# Patient Record
Sex: FEMALE | Race: WHITE | NOT HISPANIC OR LATINO | Employment: FULL TIME | URBAN - METROPOLITAN AREA
[De-identification: names, ages, dates, MRNs, and addresses within clinical notes are randomized per-mention and may not be internally consistent; named-entity substitution may affect disease eponyms.]

---

## 2017-03-28 ENCOUNTER — TRANSFERRED RECORDS (OUTPATIENT)
Dept: HEALTH INFORMATION MANAGEMENT | Facility: CLINIC | Age: 47
End: 2017-03-28

## 2017-06-26 NOTE — PROGRESS NOTES
SUBJECTIVE:     CC: Vianney Sheldon is an 46 year old woman who presents for preventive health visit.     Healthy Habits:    Do you get at least three servings of calcium containing foods daily (dairy, green leafy vegetables, etc.)? yes    Amount of exercise or daily activities, outside of work: none    Problems taking medications regularly No    Medication side effects: No    Have you had an eye exam in the past two years? yes    Do you see a dentist twice per year? yes    Do you have sleep apnea, excessive snoring or daytime drowsiness? daytime drowsiness         - Patient describes feeling depressed and anxious and experiencing panic attacks for a while. These symptoms, particularly the anxiety and panic attacks, have been more severe and frequent for the past two weeks. She expresses occasional She has a distant history of depression.      - Mirena IUD placed 8 years ago. She is not currently sexually active.       - Tobacco use disorder. Patient is currently smoking 10 cigarettes per day. She never started using Chantix, but she states that she will be quitting in the future.        Today's PHQ-2 Score:   PHQ-2 ( 1999 Pfizer) 6/27/2017   Q1: Little interest or pleasure in doing things 3   Q2: Feeling down, depressed or hopeless 3   PHQ-2 Score 6       Abuse: Current or Past(Physical, Sexual or Emotional)- No  Do you feel safe in your environment - Yes    Social History   Substance Use Topics     Smoking status: Current Every Day Smoker     Packs/day: 0.50     Years: 18.00     Smokeless tobacco: Never Used      Comment: down to 10 cig/day     Alcohol use Yes      Comment: rare/quit since preg     The patient does not drink >3 drinks per day nor >7 drinks per week.    Recent Labs   Lab Test  10/06/15   1009   CHOL  188   HDL  42*   LDL  122   TRIG  118   CHOLHDLRATIO  4.5       Reviewed orders with patient.  Reviewed health maintenance and updated orders accordingly - Yes    Mammo Decision Support:  Patient  "under age 50, mutual decision reflected in health maintenance.    History of breast cancer in Paternal Grandmother.  No previous mammogram      History of abnormal Pap smear:   NO - age 30- 65 PAP every 3 years recommended or every 5 years with negative HPV co-testing recommended. Patient has not had HPV testing in the past.  Last 3 Pap Results:   PAP (no units)   Date Value   03/03/2008 NIL   08/10/2006 NIL   06/15/2005 NIL       Reviewed and updated as needed this visit by clinical staff  Tobacco  Allergies  Meds  Med Hx  Surg Hx  Fam Hx  Soc Hx        Reviewed and updated as needed this visit by Provider            ROS:  C: NEGATIVE for fever, chills, change in weight  I: NEGATIVE for worrisome rashes, moles or lesions  E: NEGATIVE for vision changes or irritation  ENT: NEGATIVE for ear, mouth and throat problems  R: NEGATIVE for significant cough or SOB  B: NEGATIVE for masses, tenderness or discharge  CV: NEGATIVE for chest pain, palpitations or peripheral edema  GI: NEGATIVE for nausea, abdominal pain, heartburn, or change in bowel habits  : Mirena IUD placed. NEGATIVE for unusual urinary or vaginal symptoms.   M: NEGATIVE for significant arthralgias or myalgia  N: NEGATIVE for weakness, dizziness or paresthesias  P: NEGATIVE for changes in mood or affect       This document serves as a record of the services and decisions personally performed and made by Lea Meyers MD. It was created on his behalf by Brenda Salinas, a trained medical scribe. The creation of this document is based the provider's statements to the medical scribe.  Brenda Salinas 3:01 PM June 27, 2017    Labs reviewed in EPIC  OBJECTIVE:     /70  Pulse 88  Ht 5' 4\" (1.626 m)  Wt 99 lb 4 oz (45 kg)  BMI 17.04 kg/m2     EXAM:  GENERAL: Healthy, alert and no distress  EYES: Eyes grossly normal to inspection, conjunctivae and sclerae normal  HENT: Ear canals and TM's normal, nose and mouth without ulcers or lesions  NECK: No " adenopathy or thyromegaly  RESP: Lungs clear to auscultation - no rales, rhonchi or wheezes  CV: Regular rate and rhythm, normal S1 S2, no murmur, no peripheral edema  ABDOMEN: Soft, nontender  MS: No gross musculoskeletal defects noted, no edema  SKIN: No suspicious lesions or rashes  NEURO: Normal strength and tone, mentation intact and speech normal  PSYCH: Mentation appears normal, affect normal/bright    ASSESSMENT/PLAN:       (Z00.00) Routine general medical examination at a health care facility  (primary encounter diagnosis)  Comment: Routine preventative care. Reviewed lifestyle, current conditions, medications, routine screenings, labs.  Plan: Annual physical in 1 year, sooner prn for other health maintenance.     (Z12.4) Screening for malignant neoplasm of cervix  Comment: Patient defers  pap and hpv co-testing today.  Plan: Pap imaged thin layer screen with HPV -         recommended age 30 - 65 years (select HPV order        Below) -  Patient to complete at 1 month follow-up.    (Z11.51) Screening for human papillomavirus  Comment: See above  Plan: HPV High Risk Types DNA Cervical - Patient to complete at 1 month follow-up.    (F17.200) Smoker  Comment: Encouraged cessation. Patient states she plans to quit.  Plan: TOBACCO CESSATION - FOR HEALTH MAINTENANCE    (F41.0) Panic attacks  Comment: Will trial SSRI therapy.  Plan: escitalopram (LEXAPRO) 10 MG tablet - New medication. Reviewed potential side effects. Follow-up in 1 month.      (R63.6) Underweight  Comment: Body mass index is 17.04 kg/(m^2). probably secondary to stress and smoking.  Plan: Hopefully SSRI therapy will help, also discussed smoking cessation.        Patient will follow up in 1 month for medication check, cervical cancer screening, and IUD removal and in 12 months for annual physical. Patient instructed to call with any questions or concerns.      Patient Instructions     Preventive Health Recommendations  Female Ages 40 to 49    *    "For panic attacks, take a medication for depression, works for both. Lexapro. Takes about one month to work. Be patient.     *   Follow up in one month for a pap smear and IUD removal.     *   Call with any questions.     *    Anyway to stop smoking.       COUNSELING:   Reviewed preventive health counseling, as reflected in patient instructions       Regular exercise       Healthy diet/nutrition       Vision screening       Hearing screening       reports that she has been smoking.  She has a 9.00 pack-year smoking history. She has never used smokeless tobacco.  Tobacco Cessation Action Plan: Information offered: Patient not interested at this time  Estimated body mass index is 17.04 kg/(m^2) as calculated from the following:    Height as of this encounter: 5' 4\" (1.626 m).    Weight as of this encounter: 99 lb 4 oz (45 kg).   Weight management plan noted, stable and monitoring    Wt Readings from Last 10 Encounters:   06/27/17 99 lb 4 oz (45 kg)   05/16/16 100 lb 3.2 oz (45.5 kg)   09/30/15 97 lb 12.8 oz (44.4 kg)   08/19/14 100 lb 3.2 oz (45.5 kg)   10/30/13 102 lb 2 oz (46.3 kg)   06/13/12 99 lb 6.4 oz (45.1 kg)   07/27/10 103 lb (46.7 kg)   03/11/10 100 lb (45.4 kg)   04/20/09 102 lb (46.3 kg)   11/20/08 97 lb (44 kg)       Counseling Resources:  ATP IV Guidelines  Pooled Cohorts Equation Calculator  Breast Cancer Risk Calculator  FRAX Risk Assessment  ICSI Preventive Guidelines  Dietary Guidelines for Americans, 2010  USDA's MyPlate  ASA Prophylaxis  Lung CA Screening      The information in this document, created by a scribe for me, accurately reflects the services I personally performed and the decisions made by me. I have reviewed and approved this document for accuracy.  3:17 PM June 27, 2017    Lea Meyers MD  [facility]  "

## 2017-06-26 NOTE — PATIENT INSTRUCTIONS
Preventive Health Recommendations  Female Ages 40 to 49    *   For panic attacks, take a medication for depression, works for both. Lexapro. Takes about one month to work. Be patient.     *   Follow up in one month for a pap smear and IUD removal.     *   Call with any questions.     *    Anyway to stop smoking.     Yearly exam:     See your health care provider every year in order to  1. Review health changes.   2. Discuss preventive care.    3. Review your medicines if your doctor prescribed any.      Get a Pap test every three years (unless you have an abnormal result and your provider advises testing more often).      If you get Pap tests with HPV test, you only need to test every 5 years, unless you have an abnormal result. You do not need a Pap test if your uterus was removed (hysterectomy) and you have not had cancer.      You should be tested each year for STDs (sexually transmitted diseases), if you're at risk.       Ask your doctor if you should have a mammogram.      Have a colonoscopy (test for colon cancer) if someone in your family has had colon cancer or polyps before age 50.       Have a cholesterol test every 5 years.       Have a diabetes test (fasting glucose) after age 45. If you are at risk for diabetes, you should have this test every 3 years.    Shots: Get a flu shot each year. Get a tetanus shot every 10 years.     Nutrition:     Eat at least 5 servings of fruits and vegetables each day.    Eat whole-grain bread, whole-wheat pasta and brown rice instead of white grains and rice.    Talk to your provider about Calcium and Vitamin D.     Lifestyle    Exercise at least 150 minutes a week (an average of 30 minutes a day, 5 days a week). This will help you control your weight and prevent disease.    Limit alcohol to one drink per day.    No smoking.     Wear sunscreen to prevent skin cancer.    See your dentist every six months for an exam and cleaning.

## 2017-06-27 ENCOUNTER — OFFICE VISIT (OUTPATIENT)
Dept: FAMILY MEDICINE | Facility: CLINIC | Age: 47
End: 2017-06-27
Payer: COMMERCIAL

## 2017-06-27 VITALS
SYSTOLIC BLOOD PRESSURE: 104 MMHG | HEIGHT: 64 IN | BODY MASS INDEX: 16.94 KG/M2 | DIASTOLIC BLOOD PRESSURE: 70 MMHG | WEIGHT: 99.25 LBS | HEART RATE: 88 BPM

## 2017-06-27 DIAGNOSIS — Z12.4 SCREENING FOR MALIGNANT NEOPLASM OF CERVIX: ICD-10-CM

## 2017-06-27 DIAGNOSIS — Z00.00 ROUTINE GENERAL MEDICAL EXAMINATION AT A HEALTH CARE FACILITY: Primary | ICD-10-CM

## 2017-06-27 DIAGNOSIS — F17.200 TOBACCO USE DISORDER: ICD-10-CM

## 2017-06-27 DIAGNOSIS — Z11.51 SCREENING FOR HUMAN PAPILLOMAVIRUS: ICD-10-CM

## 2017-06-27 DIAGNOSIS — R63.6 UNDERWEIGHT: ICD-10-CM

## 2017-06-27 DIAGNOSIS — F41.0 PANIC ATTACKS: ICD-10-CM

## 2017-06-27 PROCEDURE — 99396 PREV VISIT EST AGE 40-64: CPT | Performed by: FAMILY MEDICINE

## 2017-06-27 RX ORDER — METHOCARBAMOL 750 MG/1
750 TABLET, FILM COATED ORAL DAILY
COMMUNITY
Start: 2016-08-23 | End: 2019-05-06

## 2017-06-27 RX ORDER — ESCITALOPRAM OXALATE 10 MG/1
10 TABLET ORAL DAILY
Qty: 30 TABLET | Refills: 1 | Status: SHIPPED | OUTPATIENT
Start: 2017-06-27 | End: 2018-05-09

## 2017-06-27 RX ORDER — IBUPROFEN 800 MG/1
1 TABLET, FILM COATED ORAL 3 TIMES DAILY
COMMUNITY
Start: 2017-05-03 | End: 2022-03-28

## 2017-06-27 ASSESSMENT — ANXIETY QUESTIONNAIRES
7. FEELING AFRAID AS IF SOMETHING AWFUL MIGHT HAPPEN: SEVERAL DAYS
5. BEING SO RESTLESS THAT IT IS HARD TO SIT STILL: NEARLY EVERY DAY
3. WORRYING TOO MUCH ABOUT DIFFERENT THINGS: NEARLY EVERY DAY
1. FEELING NERVOUS, ANXIOUS, OR ON EDGE: NEARLY EVERY DAY
GAD7 TOTAL SCORE: 19
6. BECOMING EASILY ANNOYED OR IRRITABLE: NEARLY EVERY DAY
2. NOT BEING ABLE TO STOP OR CONTROL WORRYING: NEARLY EVERY DAY

## 2017-06-27 ASSESSMENT — PATIENT HEALTH QUESTIONNAIRE - PHQ9: 5. POOR APPETITE OR OVEREATING: NEARLY EVERY DAY

## 2017-06-27 NOTE — MR AVS SNAPSHOT
After Visit Summary   6/27/2017    Vianney Sheldon    MRN: [id number]           Patient Information     Date Of Birth          1970        Visit Information        Provider Department      6/27/2017 2:40 PM Lea Meyers MD [facility]        Today's Diagnoses     Routine general medical examination at a health care facility    -  1    Screening for human papillomavirus        Screening for malignant neoplasm of cervix        Smoker        Panic attacks          Care Instructions      Preventive Health Recommendations  Female Ages 40 to 49    *   For panic attacks, take a medication for depression, works for both. Lexapro. Takes about one month to work. Be patient.     *   Follow up in one month for a pap smear and IUD removal.     *   Call with any questions.     *    Anyway to stop smoking.     Yearly exam:     See your health care provider every year in order to  1. Review health changes.   2. Discuss preventive care.    3. Review your medicines if your doctor prescribed any.      Get a Pap test every three years (unless you have an abnormal result and your provider advises testing more often).      If you get Pap tests with HPV test, you only need to test every 5 years, unless you have an abnormal result. You do not need a Pap test if your uterus was removed (hysterectomy) and you have not had cancer.      You should be tested each year for STDs (sexually transmitted diseases), if you're at risk.       Ask your doctor if you should have a mammogram.      Have a colonoscopy (test for colon cancer) if someone in your family has had colon cancer or polyps before age 50.       Have a cholesterol test every 5 years.       Have a diabetes test (fasting glucose) after age 45. If you are at risk for diabetes, you should have this test every 3 years.    Shots: Get a flu shot each year. Get a tetanus shot every 10 years.     Nutrition:     Eat at least 5 servings of fruits and  "vegetables each day.    Eat whole-grain bread, whole-wheat pasta and brown rice instead of white grains and rice.    Talk to your provider about Calcium and Vitamin D.     Lifestyle    Exercise at least 150 minutes a week (an average of 30 minutes a day, 5 days a week). This will help you control your weight and prevent disease.    Limit alcohol to one drink per day.    No smoking.     Wear sunscreen to prevent skin cancer.    See your dentist every six months for an exam and cleaning.          Follow-ups after your visit        Who to contact     Normal or non-critical lab and imaging results will be communicated to you by [employer], letter or phone within 4 business days after the clinic has received the results. If you do not hear from us within 7 days, please contact the clinic through [employer] or phone. If you have a critical or abnormal lab result, we will notify you by phone as soon as possible.  Submit refill requests through [employer] or call your pharmacy and they will forward the refill request to us. Please allow 3 business days for your refill to be completed.          If you need to speak with a  for additional information , please call: [phone]           Additional Information About Your Visit        [employer] Information     [employer] lets you send messages to your doctor, view your test results, renew your prescriptions, schedule appointments and more. To sign up, go to [email or web address] . Click on \"Log in\" on the left side of the screen, which will take you to the Welcome page. Then click on \"Sign up Now\" on the right side of the page.     You will be asked to enter the access code listed below, as well as some personal information. Please follow the directions to create your username and password.     Your access code is: [email or web address]  Expires: 2017  3:15 PM     Your access code will  in 90 days. If you need help or a new code, please call your [place] clinic or " "[phone].        Care EveryWhere ID     This is your Care EveryWhere ID. This could be used by other organizations to access your [place] medical records  [id number]        Your Vitals Were     Pulse Height BMI (Body Mass Index)             88 5' 4\" (1.626 m) 17.04 kg/m2          Blood Pressure from Last 3 Encounters:   06/27/17 104/70   05/16/16 105/68   09/30/15 106/75    Weight from Last 3 Encounters:   06/27/17 99 lb 4 oz (45 kg)   05/16/16 100 lb 3.2 oz (45.5 kg)   09/30/15 97 lb 12.8 oz (44.4 kg)              We Performed the Following     TOBACCO CESSATION - FOR HEALTH MAINTENANCE          Today's Medication Changes          These changes are accurate as of: 6/27/17  3:16 PM.  If you have any questions, ask your nurse or doctor.               Start taking these medicines.        Dose/Directions    escitalopram 10 MG tablet   Commonly known as:  LEXAPRO   Used for:  Panic attacks   Started by:  Lea Meyers MD        Dose:  10 mg   Take 1 tablet (10 mg) by mouth daily   Quantity:  30 tablet   Refills:  1         These medicines have changed or have updated prescriptions.        Dose/Directions    ibuprofen 800 MG tablet   Commonly known as:  ADVIL/MOTRIN   This may have changed:  Another medication with the same name was removed. Continue taking this medication, and follow the directions you see here.   Changed by:  Lea Meyers MD        Dose:  1 tablet   Take 1 tablet by mouth 3 times daily   Refills:  0         Stop taking these medicines if you haven't already. Please contact your care team if you have questions.     SOCORRO-SELTZER PLUS COLD & FLU PO   Stopped by:  Lea Meyers MD           azithromycin 250 MG tablet   Commonly known as:  ZITHROMAX   Stopped by:  Lea Meyers MD           PEPTO-BISMOL PO   Stopped by:  Lea Meyers MD           predniSONE 20 MG tablet   Commonly known as:  DELTASONE   Stopped by:  Lea Meyers MD                Where to get your " medicines      These medications were sent to [id number] - [place], [place] - [address]  [address], [place] [address]    Hours:  Test fax successful 12/11/02   Phone:  [phone]     escitalopram 10 MG tablet                Primary Care Provider Office Phone # Fax #    Lea Meyers [phone]       [facility] [address]   [place] [address]        Equal Access to Services     [facility]: Hadii aad ku hadasho Soomaali, waaxda luqadaha, qaybta kaalmada adeegyada, waxay idiin hayaan adeeg kharash la'aan . [facility] [phone].    ATENCIÓN: Si habla español, tiene a cho disposición servicios gratuitos de asistencia lingüística. [facility] [phone].    We comply with applicable federal civil rights laws and [place] laws. We do not discriminate on the basis of race, color, national origin, age, disability sex, sexual orientation or gender identity.            Thank you!     Thank you for choosing [facility]  for your care. Our goal is always to provide you with excellent care. Hearing back from our patients is one way we can continue to improve our services. Please take a few minutes to complete the written survey that you may receive in the mail after your visit with us. Thank you!             Your Updated Medication List - Protect others around you: Learn how to safely use, store and throw away your medicines at www.disposemymeds.org.          This list is accurate as of: 6/27/17  3:16 PM.  Always use your most recent med list.                   Brand Name Dispense Instructions for use Diagnosis    * albuterol 108 (90 BASE) MCG/ACT Inhaler    PROAIR HFA/PROVENTIL HFA/VENTOLIN HFA    1 Inhaler    Inhale 2 puffs into the lungs every 6 hours as needed for shortness of breath / dyspnea or wheezing    LLL pneumonia (H)       * albuterol 108 (90 BASE) MCG/ACT Inhaler    PROAIR HFA/PROVENTIL  HFA/VENTOLIN HFA    1 Inhaler    Inhale 2 puffs into the lungs every 6 hours as needed for shortness of breath / dyspnea or wheezing    Bronchitis       amitriptyline 25 MG tablet    ELAVIL     Take 25 mg by mouth At Bedtime        escitalopram 10 MG tablet    LEXAPRO    30 tablet    Take 1 tablet (10 mg) by mouth daily    Panic attacks       ibuprofen 800 MG tablet    ADVIL/MOTRIN     Take 1 tablet by mouth 3 times daily        methocarbamol 750 MG tablet    ROBAXIN     Take 750 mg by mouth daily        varenicline 0.5 MG X 11 & 1 MG X 42 tablet    CHANTIX STARTING MONTH ANASTASIIA    53 tablet    Take 0.5 mg tab daily for 3 days, then 0.5 mg tab twice daily for 4 days, then 1 mg twice daily.    Tobacco use disorder       * Notice:  This list has 2 medication(s) that are the same as other medications prescribed for you. Read the directions carefully, and ask your doctor or other care provider to review them with you.

## 2017-06-27 NOTE — NURSING NOTE
"Chief Complaint   Patient presents with     Physical       Initial /70  Pulse 88  Ht 5' 4\" (1.626 m)  Wt 99 lb 4 oz (45 kg)  BMI 17.04 kg/m2 Estimated body mass index is 17.04 kg/(m^2) as calculated from the following:    Height as of this encounter: 5' 4\" (1.626 m).    Weight as of this encounter: 99 lb 4 oz (45 kg).  Medication Reconciliation: complete  "

## 2017-06-28 ASSESSMENT — PATIENT HEALTH QUESTIONNAIRE - PHQ9: SUM OF ALL RESPONSES TO PHQ QUESTIONS 1-9: 23

## 2017-06-28 ASSESSMENT — ANXIETY QUESTIONNAIRES: GAD7 TOTAL SCORE: 19

## 2017-07-08 ENCOUNTER — HEALTH MAINTENANCE LETTER (OUTPATIENT)
Age: 47
End: 2017-07-08

## 2018-01-22 ENCOUNTER — TELEPHONE (OUTPATIENT)
Dept: FAMILY MEDICINE | Facility: CLINIC | Age: 48
End: 2018-01-22

## 2018-01-22 NOTE — TELEPHONE ENCOUNTER
Panel Management Review      Patient has the following on her problem list: None      Composite cancer screening  Chart review shows that this patient is due/due soon for the following Pap Smear  Summary:    Patient is due/failing the following:   Office visit to recheck medication and also pap smear which was deferred at last office visit.    Action needed:   Patient needs office visit for medication recheck and pap.    Type of outreach:    Phone, left message for patient to call back.     Questions for provider review:    None                                                                                                                                    Stephanie Merida CMA

## 2018-03-20 ENCOUNTER — RADIANT APPOINTMENT (OUTPATIENT)
Dept: GENERAL RADIOLOGY | Facility: CLINIC | Age: 48
End: 2018-03-20
Attending: PHYSICIAN ASSISTANT
Payer: COMMERCIAL

## 2018-03-20 ENCOUNTER — OFFICE VISIT (OUTPATIENT)
Dept: URGENT CARE | Facility: URGENT CARE | Age: 48
End: 2018-03-20
Payer: COMMERCIAL

## 2018-03-20 VITALS
TEMPERATURE: 100.1 F | OXYGEN SATURATION: 92 % | DIASTOLIC BLOOD PRESSURE: 75 MMHG | HEART RATE: 120 BPM | WEIGHT: 100 LBS | SYSTOLIC BLOOD PRESSURE: 121 MMHG | BODY MASS INDEX: 17.16 KG/M2

## 2018-03-20 DIAGNOSIS — R06.2 WHEEZING: ICD-10-CM

## 2018-03-20 DIAGNOSIS — J22 LOWER RESPIRATORY INFECTION: Primary | ICD-10-CM

## 2018-03-20 DIAGNOSIS — F41.9 ANXIETY: ICD-10-CM

## 2018-03-20 PROCEDURE — 71046 X-RAY EXAM CHEST 2 VIEWS: CPT | Mod: FY

## 2018-03-20 PROCEDURE — 99214 OFFICE O/P EST MOD 30 MIN: CPT | Mod: 25 | Performed by: PHYSICIAN ASSISTANT

## 2018-03-20 PROCEDURE — 94640 AIRWAY INHALATION TREATMENT: CPT | Performed by: PHYSICIAN ASSISTANT

## 2018-03-20 RX ORDER — ALBUTEROL SULFATE 90 UG/1
2 AEROSOL, METERED RESPIRATORY (INHALATION) EVERY 4 HOURS PRN
Qty: 1 INHALER | Refills: 0 | Status: SHIPPED | OUTPATIENT
Start: 2018-03-20 | End: 2019-05-06

## 2018-03-20 RX ORDER — ALBUTEROL SULFATE 0.83 MG/ML
1 SOLUTION RESPIRATORY (INHALATION) ONCE
Qty: 3 ML | Refills: 0
Start: 2018-03-20 | End: 2019-05-06

## 2018-03-20 RX ORDER — PREDNISONE 20 MG/1
TABLET ORAL
Qty: 10 TABLET | Refills: 0 | Status: SHIPPED | OUTPATIENT
Start: 2018-03-20 | End: 2019-05-06

## 2018-03-20 RX ORDER — CEFUROXIME AXETIL 500 MG/1
500 TABLET ORAL 2 TIMES DAILY
Qty: 20 TABLET | Refills: 0 | Status: SHIPPED | OUTPATIENT
Start: 2018-03-20 | End: 2019-05-06

## 2018-03-20 RX ORDER — ESCITALOPRAM OXALATE 10 MG/1
10 TABLET ORAL DAILY
Qty: 30 TABLET | Refills: 1 | Status: SHIPPED | OUTPATIENT
Start: 2018-03-20 | End: 2019-05-06

## 2018-03-20 NOTE — PROGRESS NOTES
SUBJECTIVE:                                                    Vianney Sheldon is a 47 year old female who presents to clinic today for the following health issues:      RESPIRATORY SYMPTOMS      Duration: X 10 days    Description  cough, fever, chills, chest congestion, runny nose, ears plugged, shortness of breath    Severity: moderate    Accompanying signs and symptoms: urine in AM as been very orange    History (predisposing factors):  Exposed to Influenza B    Precipitating or alleviating factors: None    Therapies tried and outcome:  Mucinex, inhaler, Ibuprofen      Grand daughter had Influenza. She then became sick a couple of days after taking care of her.    Wheezing.    Also wants refill of Lexapro for her anxiety. Works well. No thoughts of harming herself or others.    Allergies   Allergen Reactions     Flexeril [Cyclobenzaprine]        Past Medical History:   Diagnosis Date     Depressive disorder, not elsewhere classified      Tobacco use disorder          Current Outpatient Prescriptions on File Prior to Visit:  methocarbamol (ROBAXIN) 750 MG tablet Take 750 mg by mouth daily   escitalopram (LEXAPRO) 10 MG tablet Take 1 tablet (10 mg) by mouth daily   albuterol (PROAIR HFA, PROVENTIL HFA, VENTOLIN HFA) 108 (90 BASE) MCG/ACT inhaler Inhale 2 puffs into the lungs every 6 hours as needed for shortness of breath / dyspnea or wheezing   amitriptyline (ELAVIL) 25 MG tablet Take 25 mg by mouth At Bedtime   ibuprofen (ADVIL/MOTRIN) 800 MG tablet Take 1 tablet by mouth 3 times daily   albuterol (PROAIR HFA, PROVENTIL HFA, VENTOLIN HFA) 108 (90 BASE) MCG/ACT inhaler Inhale 2 puffs into the lungs every 6 hours as needed for shortness of breath / dyspnea or wheezing   varenicline (CHANTIX STARTING MONTH PAK) 0.5 MG X 11 & 1 MG X 42 tablet Take 0.5 mg tab daily for 3 days, then 0.5 mg tab twice daily for 4 days, then 1 mg twice daily. (Patient not taking: Reported on 3/20/2018)     No current  facility-administered medications on file prior to visit.     Social History   Substance Use Topics     Smoking status: Current Every Day Smoker     Packs/day: 0.50     Years: 18.00     Smokeless tobacco: Never Used      Comment: down to 10 cig/day     Alcohol use Yes      Comment: rare/quit since preg       ROS:  CONSTITUTIONAL: Positive for fatigue.  EYES: Negative for eye problems.  ENT: As above.  RESP: As above.  CV: Negative for chest pains.  GI: Negative for vomiting.  MUSCULOSKELETAL:  Positive for body aches.  NEUROLOGIC: Negative for headaches.  SKIN: Negative for rash.    OBJECTIVE:  /75  Pulse 107  Temp 100.1  F (37.8  C) (Tympanic)  Wt 100 lb (45.4 kg)  SpO2 94%  BMI 17.16 kg/m2  GENERAL APPEARANCE: Healthy, alert and no distress.  EYES:Conjunctiva/sclera clear.  EARS: No cerumen.   Ear canals w/o erythema.  TM's intact w/o erythema.    NOSE/MOUTH: Nose without ulcers, erythema or lesions.  SINUSES: No maxillary sinus tenderness.  THROAT: No erythema w/o tonsillar enlargement . No exudates.  NECK: Supple, nontender, no lymphadenopathy.  RESP: Lungs with wheezing throughout. After neb much better air movement.  CV: Regular rate and rhythm, normal S1 S2, no murmur noted.  NEURO: Awake, alert    SKIN: No rashes    CXR-  Study Result   CHEST TWO VIEWS  3/20/2018 5:49 PM      HISTORY: Wheezing.     COMPARISON: 5/16/2016         IMPRESSION: Minimal interstitial changes are seen in both lung zones  which appear chronic. No alveolar infiltrates or effusions are  present. Lungs are slightly hyperinflated. Heart size and pulmonary  vascular normal.     BAR HSU MD     ASSESSMENT:     ICD-10-CM    1. Lower respiratory infection J22 cefuroxime (CEFTIN) 500 MG tablet   2. Wheezing R06.2 XR Chest 2 Views     albuterol (2.5 MG/3ML) 0.083% neb solution     INHALATION/NEBULIZER TREATMENT, INITIAL     albuterol (PROAIR HFA/PROVENTIL HFA/VENTOLIN HFA) 108 (90 BASE) MCG/ACT Inhaler     predniSONE (DELTASONE)  20 MG tablet   3. Anxiety F41.9 escitalopram (LEXAPRO) 10 MG tablet           PLAN:  Lots of rest and fluids.  RTC if any worsening symptoms or if not improving.    Maile Marte PA-C

## 2018-03-20 NOTE — NURSING NOTE
The following nebulizer treatment was given:     MEDICATION: Albuterol Sulfate 2.5 mg  : [employer]  LOT #: [id number]  EXPIRATION DATE:  05/01/2019  NDC # 7931-0954-90  Arcelia Cloud CMA

## 2018-03-20 NOTE — MR AVS SNAPSHOT
"              After Visit Summary   3/20/2018    Vianney Sheldon    MRN: [id number]           Patient Information     Date Of Birth          1970        Visit Information        Provider Department      3/20/2018 5:10 PM Maile Marte PA-C [facility]        Today's Diagnoses     Lower respiratory infection    -  1    Wheezing        Anxiety           Follow-ups after your visit        Who to contact     If you have questions or need follow up information about today's clinic visit or your schedule please contact [facility] directly at [phone].  Normal or non-critical lab and imaging results will be communicated to you by [employer], letter or phone within 4 business days after the clinic has received the results. If you do not hear from us within 7 days, please contact the clinic through [employer] or phone. If you have a critical or abnormal lab result, we will notify you by phone as soon as possible.  Submit refill requests through [employer] or call your pharmacy and they will forward the refill request to us. Please allow 3 business days for your refill to be completed.          Additional Information About Your Visit        MyChart Information     [employer] lets you send messages to your doctor, view your test results, renew your prescriptions, schedule appointments and more. To sign up, go to [email or web address] . Click on \"Log in\" on the left side of the screen, which will take you to the Welcome page. Then click on \"Sign up Now\" on the right side of the page.     You will be asked to enter the access code listed below, as well as some personal information. Please follow the directions to create your username and password.     Your access code is: [email or web address]  Expires: 2018  6:29 PM     Your access code will  in 90 days. If you need help or a new code, please call your [facility] or [phone].        Care EveryWhere ID     This is your Care EveryWhere ID. " This could be used by other organizations to access your [facility] medical records  [id number]        Your Vitals Were     Pulse Temperature Pulse Oximetry BMI (Body Mass Index)          120 100.1  F (37.8  C) (Tympanic) 92% 17.16 kg/m2         Blood Pressure from Last 3 Encounters:   03/20/18 121/75   06/27/17 104/70   05/16/16 105/68    Weight from Last 3 Encounters:   03/20/18 100 lb (45.4 kg)   06/27/17 99 lb 4 oz (45 kg)   05/16/16 100 lb 3.2 oz (45.5 kg)              We Performed the Following     INHALATION/NEBULIZER TREATMENT, INITIAL          Today's Medication Changes          These changes are accurate as of 3/20/18  6:29 PM.  If you have any questions, ask your nurse or doctor.               Start taking these medicines.        Dose/Directions    cefuroxime 500 MG tablet   Commonly known as:  CEFTIN   Used for:  Lower respiratory infection   Started by:  Maile Marte PA-C        Dose:  500 mg   Take 1 tablet (500 mg) by mouth 2 times daily   Quantity:  20 tablet   Refills:  0       predniSONE 20 MG tablet   Commonly known as:  DELTASONE   Used for:  Wheezing   Started by:  Maile Marte PA-C        2 tabs po daily   Quantity:  10 tablet   Refills:  0         These medicines have changed or have updated prescriptions.        Dose/Directions    * albuterol 108 (90 BASE) MCG/ACT Inhaler   Commonly known as:  PROAIR HFA/PROVENTIL HFA/VENTOLIN HFA   This may have changed:  Another medication with the same name was added. Make sure you understand how and when to take each.   Used for:  LLL pneumonia (H)   Changed by:  Maile Marte PA-C        Dose:  2 puff   Inhale 2 puffs into the lungs every 6 hours as needed for shortness of breath / dyspnea or wheezing   Quantity:  1 Inhaler   Refills:  1       * albuterol 108 (90 BASE) MCG/ACT Inhaler   Commonly known as:  PROAIR HFA/PROVENTIL HFA/VENTOLIN HFA   This may have changed:  Another medication with the same name was added. Make sure you  understand how and when to take each.   Used for:  Bronchitis   Changed by:  Maile Marte PA-C        Dose:  2 puff   Inhale 2 puffs into the lungs every 6 hours as needed for shortness of breath / dyspnea or wheezing   Quantity:  1 Inhaler   Refills:  0       * albuterol (2.5 MG/3ML) 0.083% neb solution   This may have changed:  You were already taking a medication with the same name, and this prescription was added. Make sure you understand how and when to take each.   Used for:  Wheezing   Changed by:  Maile Marte PA-C        Dose:  1 vial   Take 1 vial (2.5 mg) by nebulization once for 1 dose   Quantity:  3 mL   Refills:  0       * albuterol 108 (90 BASE) MCG/ACT Inhaler   Commonly known as:  PROAIR HFA/PROVENTIL HFA/VENTOLIN HFA   This may have changed:  You were already taking a medication with the same name, and this prescription was added. Make sure you understand how and when to take each.   Used for:  Wheezing   Changed by:  Maile Marte PA-C        Dose:  2 puff   Inhale 2 puffs into the lungs every 4 hours as needed for shortness of breath / dyspnea or wheezing   Quantity:  1 Inhaler   Refills:  0       * escitalopram 10 MG tablet   Commonly known as:  LEXAPRO   This may have changed:  Another medication with the same name was added. Make sure you understand how and when to take each.   Used for:  Panic attacks   Changed by:  Maile Marte PA-C        Dose:  10 mg   Take 1 tablet (10 mg) by mouth daily   Quantity:  30 tablet   Refills:  1       * escitalopram 10 MG tablet   Commonly known as:  LEXAPRO   This may have changed:  You were already taking a medication with the same name, and this prescription was added. Make sure you understand how and when to take each.   Used for:  Anxiety   Changed by:  Maile Marte PA-C        Dose:  10 mg   Take 1 tablet (10 mg) by mouth daily   Quantity:  30 tablet   Refills:  1       * Notice:  This list has 6 medication(s) that are  the same as other medications prescribed for you. Read the directions carefully, and ask your doctor or other care provider to review them with you.         Where to get your medicines      These medications were sent to [facility] [address], [address]  [address], [address], [facility] [address]     Phone:  [phone]     albuterol 108 (90 BASE) MCG/ACT Inhaler    cefuroxime 500 MG tablet    escitalopram 10 MG tablet    predniSONE 20 MG tablet         Some of these will need a paper prescription and others can be bought over the counter.  Ask your nurse if you have questions.     You don't need a prescription for these medications     albuterol (2.5 MG/3ML) 0.083% neb solution                Primary Care Provider Office Phone # Fax #    Lea Meyers [phone] [address] [place] [address]        Equal Access to Services     [facility]: Hadii chalo thurmano Sogayle, waaxda luqadaha, qaybta kaalmada adeegyada, hilton jo . So [facility] [phone].    ATENCIÓN: Si habla español, tiene a cho disposición servicios gratuitos de asistencia lingüística. [facility] [phone].    We comply with applicable federal civil rights laws and [place] laws. We do not discriminate on the basis of race, color, national origin, age, disability, sex, sexual orientation, or gender identity.            Thank you!     Thank you for choosing [facility]  for your care. Our goal is always to provide you with excellent care. Hearing back from our patients is one way we can continue to improve our services. Please take a few minutes to complete the written survey that you may receive in the mail after your visit with us. Thank you!             Your Updated Medication List - Protect others around you: Learn how to safely use, store and throw away your medicines at www.disposemymeds.org.          This list is accurate as  of 3/20/18  6:29 PM.  Always use your most recent med list.                   Brand Name Dispense Instructions for use Diagnosis    * albuterol 108 (90 BASE) MCG/ACT Inhaler    PROAIR HFA/PROVENTIL HFA/VENTOLIN HFA    1 Inhaler    Inhale 2 puffs into the lungs every 6 hours as needed for shortness of breath / dyspnea or wheezing    LLL pneumonia (H)       * albuterol 108 (90 BASE) MCG/ACT Inhaler    PROAIR HFA/PROVENTIL HFA/VENTOLIN HFA    1 Inhaler    Inhale 2 puffs into the lungs every 6 hours as needed for shortness of breath / dyspnea or wheezing    Bronchitis       * albuterol (2.5 MG/3ML) 0.083% neb solution     3 mL    Take 1 vial (2.5 mg) by nebulization once for 1 dose    Wheezing       * albuterol 108 (90 BASE) MCG/ACT Inhaler    PROAIR HFA/PROVENTIL HFA/VENTOLIN HFA    1 Inhaler    Inhale 2 puffs into the lungs every 4 hours as needed for shortness of breath / dyspnea or wheezing    Wheezing       amitriptyline 25 MG tablet    ELAVIL     Take 25 mg by mouth At Bedtime        cefuroxime 500 MG tablet    CEFTIN    20 tablet    Take 1 tablet (500 mg) by mouth 2 times daily    Lower respiratory infection       * escitalopram 10 MG tablet    LEXAPRO    30 tablet    Take 1 tablet (10 mg) by mouth daily    Panic attacks       * escitalopram 10 MG tablet    LEXAPRO    30 tablet    Take 1 tablet (10 mg) by mouth daily    Anxiety       ibuprofen 800 MG tablet    ADVIL/MOTRIN     Take 1 tablet by mouth 3 times daily        methocarbamol 750 MG tablet    ROBAXIN     Take 750 mg by mouth daily        predniSONE 20 MG tablet    DELTASONE    10 tablet    2 tabs po daily    Wheezing       varenicline 0.5 MG X 11 & 1 MG X 42 tablet    CHANTIX STARTING MONTH ANASTASIIA    53 tablet    Take 0.5 mg tab daily for 3 days, then 0.5 mg tab twice daily for 4 days, then 1 mg twice daily.    Tobacco use disorder       * Notice:  This list has 6 medication(s) that are the same as other medications prescribed for you. Read the directions  carefully, and ask your doctor or other care provider to review them with you.

## 2018-04-02 PROBLEM — F41.9 ANXIETY: Status: ACTIVE | Noted: 2018-04-02

## 2018-04-02 PROBLEM — F41.0 PANIC ATTACK: Status: ACTIVE | Noted: 2018-04-02

## 2018-05-09 DIAGNOSIS — F41.0 PANIC ATTACKS: ICD-10-CM

## 2018-05-09 NOTE — TELEPHONE ENCOUNTER
"Requested Prescriptions   Pending Prescriptions Disp Refills     escitalopram (LEXAPRO) 10 MG tablet [Pharmacy Med Name: ESCITALOPRAM 10MG TABLETS] 30 tablet 0    Last Written Prescription Date:  3/20/18  Last Fill Quantity: 30,  # refills: 1   Last office visit: 6/27/2017 with prescribing provider:  6/27/17   Future Office Visit:     Sig: TAKE 1 TABLET(10 MG) BY MOUTH DAILY    SSRIs Protocol Passed    5/9/2018  3:32 PM       Passed - Recent (12 mo) or future (30 days) visit within the authorizing provider's specialty    Patient had office visit in the last 12 months or has a visit in the next 30 days with authorizing provider or within the authorizing provider's specialty.  See \"Patient Info\" tab in inbasket, or \"Choose Columns\" in Meds & Orders section of the refill encounter.           Passed - Patient is age 18 or older       Passed - No active pregnancy on record       Passed - No positive pregnancy test in last 12 months          "

## 2018-05-10 RX ORDER — ESCITALOPRAM OXALATE 10 MG/1
TABLET ORAL
Qty: 30 TABLET | Refills: 0 | Status: SHIPPED | OUTPATIENT
Start: 2018-05-10 | End: 2019-05-06

## 2018-05-10 NOTE — TELEPHONE ENCOUNTER
Medication is being filled for 1 time refill only due to:   Over due for office visit and/or labs   CHRISTA Lopez  RN/Mitch Moise

## 2019-04-29 NOTE — PROGRESS NOTES
SUBJECTIVE:   Vianney Sheldon is a 48 year old female who presents to clinic today for the following   health issues:      Anxiety Follow-Up  Lexapro 10mg every day, amitriptyline 25 mg at bedtime PRN when really depressed and methocarbamol 750 mg at night to help with sleep.    Status since last visit: Worsened - notes work 5 days, on Saturday has no motivation. More motivated on Sunday. Needs a day to recover.    Other associated symptoms:None    Complicating factors:   Significant life event: Yes-  She and family will be moving to new home May 15! Current substance abuse: None  Depression symptoms: No        TRISTON-7 SCORE 6/27/2017   Total Score 19       TRISTON-7    Tobacco Abuse   Patient is currently smoking cigarettes, 0.5ppd. She states that when she was using Chantix she did have strange dreams. She states that if she took this once in the morning and then again in the afternoon this problem resolved.    Preventative Screening  Patient has not completed colonoscopy, FIT testing.  She denies any family history of colorectal cancer in first degree relative.     Patient has not complete mammogram. Notes paternal grandmother had breast cancer.        Amount of exercise or physical activity: None outside when at work or now packing things at home    Problems taking medications regularly: No    Medication side effects: none    Diet: regular (no restrictions)      Additional history: as documented    Reviewed  and updated as needed this visit by clinical staff         Reviewed and updated as needed this visit by Provider         Labs reviewed in EPIC    ROS:  CONSTITUTIONAL: NEGATIVE for fever, chills, change in weight  INTEGUMENTARY/SKIN: NEGATIVE for worrisome rashes, moles or lesions  ENT/MOUTH: NEGATIVE for ear, mouth and throat problems  RESP: POSITIVE for cough and Hx wheezing  CV: NEGATIVE for chest pain, palpitations or peripheral edema  MS: POSITIVE for neck pain  PSYCHIATRIC: POSITIVE for anxiety and  "stress    This document serves as a record of the services and decisions personally performed and made by Lea Meyers MD. It was created on his behalf by Fabio Carlton, a trained medical scribe. The creation of this document is based the provider's statements to the medical scribe.  Fabio Carlton 4:17 PM May 6, 2019    OBJECTIVE:                                                    /70   Pulse 76   Temp 98.9  F (37.2  C) (Tympanic)   Resp 16   Ht 1.626 m (5' 4\")   Wt 44.2 kg (97 lb 6 oz)   BMI 16.71 kg/m    Body mass index is 16.71 kg/m .   GENERAL: alert, pleasant and in no distress, thin.  HENT: ear canals- normal; TMs- normal; Nose- normal; Mouth- no ulcers, no lesions  RESP: lungs clear to auscultation - no rales, no rhonchi, no wheezes  CV: regular rates and rhythm, normal S1 S2  PSYCH: mentation appears normal, affect normal/bright    Diagnostic test results:  Diagnostic Test Results:  none      ASSESSMENT/PLAN:                                                    (F17.200) Smoker  (primary encounter diagnosis)  Comment: Patient is tolerated Chantix in the past without significant mood changes, anxiety, or depressive symptoms.  Plan: varenicline (CHANTIX ANASTASIIA) 0.5 MG X 11 & 1 MG X         42 tablet, varenicline (CHANTIX) 1 MG tablet       (S16.1XXA) Strain of neck muscle, initial encounter  Comment: Probable soft tissue strain.  Plan: [facility] REFERRAL,         amitriptyline (ELAVIL) 25 MG tablet,         methocarbamol (ROBAXIN) 750 MG tablet        Reviewed side effects of the medications including dry mouth, sedation. Activity as tolerated, follow up if not better after PT, sooner if worse, if resolved follow up will be prn.     (R06.2) Wheezing  Comment: To be used as needed.  No need for controller medication at this time.  Symptoms may improve with smoking cessation.  Plan: albuterol (PROAIR HFA/PROVENTIL HFA/VENTOLIN         HFA) 108 (90 Base) MCG/ACT inhaler       (Z12.11) " Screen for colon cancer  Comment: Preventative screening  Plan: GASTROENTEROLOGY ADULT REF PROCEDURE ONLY [facility] [phone]; [facility]         Surgeon    (F41.1) Generalized anxiety disorder  Comment: Appears to be doing reasonably well on SSRI therapy.  Plan: escitalopram (LEXAPRO) 20 MG tablet        Continue.  Follow-up in 1 year.      (R63.6) Underweight  Comment:Body mass index is 16.71 kg/m .   Plan: Decrease, patient may benefit from nutritional supplements.  I would recommend something like Ensure 3 times daily.        Patient Instructions   *   Refills on the medications.     *   For the anxiety, no change in medications for now.     *  Call about setting up a mammogram: [phone].     *   Call about setting up a colonoscopy: [phone].     *   Good luck with the move.         The information in this document, created by a scribe for me, accurately reflects the services I personally performed and the decisions made by me. I have reviewed and approved this document for accuracy.     Lea Meyers MD  [facility]

## 2019-05-06 ENCOUNTER — OFFICE VISIT (OUTPATIENT)
Dept: FAMILY MEDICINE | Facility: CLINIC | Age: 49
End: 2019-05-06
Payer: COMMERCIAL

## 2019-05-06 VITALS
BODY MASS INDEX: 16.62 KG/M2 | HEART RATE: 76 BPM | WEIGHT: 97.38 LBS | TEMPERATURE: 98.9 F | DIASTOLIC BLOOD PRESSURE: 70 MMHG | HEIGHT: 64 IN | RESPIRATION RATE: 16 BRPM | SYSTOLIC BLOOD PRESSURE: 102 MMHG

## 2019-05-06 DIAGNOSIS — R63.6 UNDERWEIGHT: ICD-10-CM

## 2019-05-06 DIAGNOSIS — F41.1 GENERALIZED ANXIETY DISORDER: ICD-10-CM

## 2019-05-06 DIAGNOSIS — F17.200 TOBACCO USE DISORDER: Primary | ICD-10-CM

## 2019-05-06 DIAGNOSIS — S16.1XXA STRAIN OF NECK MUSCLE, INITIAL ENCOUNTER: ICD-10-CM

## 2019-05-06 DIAGNOSIS — Z12.11 SCREEN FOR COLON CANCER: ICD-10-CM

## 2019-05-06 DIAGNOSIS — R06.2 WHEEZING: ICD-10-CM

## 2019-05-06 PROCEDURE — 99214 OFFICE O/P EST MOD 30 MIN: CPT | Performed by: FAMILY MEDICINE

## 2019-05-06 RX ORDER — VARENICLINE TARTRATE 1 MG/1
1 TABLET, FILM COATED ORAL 2 TIMES DAILY
Qty: 60 TABLET | Refills: 2 | Status: SHIPPED | OUTPATIENT
Start: 2019-05-06 | End: 2022-05-18

## 2019-05-06 RX ORDER — METHOCARBAMOL 750 MG/1
750 TABLET, FILM COATED ORAL DAILY
Qty: 90 TABLET | Refills: 1 | Status: SHIPPED | OUTPATIENT
Start: 2019-05-06 | End: 2022-05-18

## 2019-05-06 RX ORDER — ALBUTEROL SULFATE 90 UG/1
2 AEROSOL, METERED RESPIRATORY (INHALATION) EVERY 6 HOURS PRN
Qty: 8.5 G | Refills: 3 | Status: SHIPPED | OUTPATIENT
Start: 2019-05-06 | End: 2022-03-28

## 2019-05-06 RX ORDER — ESCITALOPRAM OXALATE 20 MG/1
20 TABLET ORAL DAILY
Qty: 90 TABLET | Refills: 1 | Status: SHIPPED | OUTPATIENT
Start: 2019-05-06 | End: 2022-05-18

## 2019-05-06 ASSESSMENT — ANXIETY QUESTIONNAIRES
1. FEELING NERVOUS, ANXIOUS, OR ON EDGE: NEARLY EVERY DAY
GAD7 TOTAL SCORE: 17
7. FEELING AFRAID AS IF SOMETHING AWFUL MIGHT HAPPEN: SEVERAL DAYS
3. WORRYING TOO MUCH ABOUT DIFFERENT THINGS: MORE THAN HALF THE DAYS
5. BEING SO RESTLESS THAT IT IS HARD TO SIT STILL: MORE THAN HALF THE DAYS
2. NOT BEING ABLE TO STOP OR CONTROL WORRYING: NEARLY EVERY DAY
6. BECOMING EASILY ANNOYED OR IRRITABLE: NEARLY EVERY DAY

## 2019-05-06 ASSESSMENT — PATIENT HEALTH QUESTIONNAIRE - PHQ9
SUM OF ALL RESPONSES TO PHQ QUESTIONS 1-9: 18
5. POOR APPETITE OR OVEREATING: NEARLY EVERY DAY

## 2019-05-06 ASSESSMENT — MIFFLIN-ST. JEOR: SCORE: 1046.69

## 2019-05-06 NOTE — PATIENT INSTRUCTIONS
*   Refills on the medications.     *   For the anxiety, no change in medications for now.     *  Call about setting up a mammogram: [phone].     *   Call about setting up a colonoscopy: [phone].     *   Good luck with the move.     *    For physical therapy, stop by here or call [phone].

## 2019-05-07 ASSESSMENT — ANXIETY QUESTIONNAIRES: GAD7 TOTAL SCORE: 17

## 2019-05-08 ENCOUNTER — TELEPHONE (OUTPATIENT)
Dept: FAMILY MEDICINE | Facility: CLINIC | Age: 49
End: 2019-05-08

## 2019-05-08 DIAGNOSIS — R63.6 UNDERWEIGHT: Primary | ICD-10-CM

## 2019-05-08 NOTE — TELEPHONE ENCOUNTER
Pt is calling and states that she  Has lost  A lot of weight from being sick and was wondering if she could get a rx for ensure. Please send to [facility] in [place]

## 2019-05-09 ENCOUNTER — TELEPHONE (OUTPATIENT)
Dept: FAMILY MEDICINE | Facility: CLINIC | Age: 49
End: 2019-05-09

## 2019-05-09 NOTE — TELEPHONE ENCOUNTER
Received a Prior Authorization (PA) request from [facility] [employer] for Ensure Complete Chocolate    Routed to the [employer] [facility] epa team        Augustin DUFFY (r)  [facility]

## 2019-05-15 NOTE — TELEPHONE ENCOUNTER
Central Prior Authorization Team   Phone: [phone]      PA Initiation    Medication: Ensure Complete-Initiated  Insurance Company: [employer] - Phone [phone] Fax [phone]  Pharmacy Filling the Rx: [id number] [address] [place] [address] - [address] [facility] [address]  Filling Pharmacy Phone: [phone]  Filling Pharmacy Fax:    Start Date: 5/15/2019

## 2019-05-15 NOTE — TELEPHONE ENCOUNTER
PRIOR AUTHORIZATION DENIED    Medication: Ensure Complete-DENIED    Denial Date: 5/15/2019    Denial Rational: Prescription benefit plan does not cover medication.  Patient can see if it will go through DME.        Appeal Information:

## 2019-05-16 NOTE — TELEPHONE ENCOUNTER
Discussed with the pharmacy.    Routed to the provider.      Augustin Ambrosio RT (r)  [facility]

## 2019-10-24 ENCOUNTER — TELEPHONE (OUTPATIENT)
Dept: FAMILY MEDICINE | Facility: CLINIC | Age: 49
End: 2019-10-24

## 2019-10-24 NOTE — LETTER
October 24, 2019    Vianney Sheldon  [address] [facility] [address]    Dear Piter Faith cares about your health and your health plan.  I have reviewed your medical conditions, medication list and lab results, and am making recommendations based on this review to better manage your health.    You are in particular need of attention regarding:  -Breast Cancer Screening  -Colon Cancer Screening  -Cervical Cancer Screening  -Wellness (Physical) Visit     I am recommending that you:     -schedule a WELLNESS (Physical) APPOINTMENT with me.       -schedule a MAMMOGRAM which is due.    1 in 8 women will develop invasive breast cancer during her lifetime and it is the most common non-skin cancer in American women.  EARLY detection, new treatments, and a better understanding of the disease have increased survival rates - the 5 year survival rate in the 1960s was 63% and today it is close to 90%.    If you are under/uninsured, we recommend you contact the [facility]. They offer mammograms at no charge or on a sliding fee charge. You can schedule with them at [phone]. Please have them send us the results.      Please disregard this reminder if you have had this exam elsewhere within the last year.  It would be helpful for us to have a copy of your mammogram report in your file so that we can best coordinate your care - please contact us with when your test was done so we can update your record.             -schedule a COLONOSCOPY to look for colon cancer (due every 10 years or 5 years in higher risk situations.)        Colon cancer is now the second leading cause of cancer-related deaths in the [place] for both men and women and there are over 130,000 new cases and 50,000 deaths per year from colon cancer.  Colonoscopies can prevent 90-95% of these deaths.  Problem lesions can be removed before they ever become cancer.  This test is not only looking for cancer, but also getting rid of  precancerious lesions.    If you are under/uninsured, we recommend you contact the [employer] [facility] program. [facility] [employer] is a free colorectal cancer screening program that provides colonoscopies for eligible under/uninsured [place] men and women. If you are interested in receiving a free colonoscopy, please call [employer] at [phone] (mention code [employer]) to see if you re eligible.      If you do not wish to do a colonoscopy or cannot afford to do one, at this time, there is another option. It is called a FIT test or Fecal Immunochemical Occult Blood Test (take home stool sample kit).  It does not replace the colonoscopy for colorectal cancer screening, but it can detect hidden bleeding in the lower colon.  It does need to be repeated every year and if a positive result is obtained, you would be referred for a colonoscopy.          If you have completed either one of these tests at another facility, please call with the details of when and where the tests were done and if they were normal or not. Or have the records sent to our clinic so that we can best coordinate your care.    -schedule a PAP SMEAR EXAM which is due.  Please disregard this reminder if you have had this exam elsewhere within the last year.  It would be helpful for us to have a copy of your recent pap smear report in our file so that we can best coordinate your care.    If you are under/uninsured, we recommend you contact the [facility]. They offer pap smears at no charge or on a sliding fee charge. You can schedule with them at [phone]. Please have them send us the results.      Please call us at the [phone] or use "[employer] to address the above recommendations.     Thank you for trusting [facility].  We appreciate the opportunity to serve you and look forward to supporting your healthcare in the future.    If you have (or plan to have) any of these tests done at a facility other than a [facility] or a [facility], please have the results sent to the [facility] location noted above.      Best Regards,    Lea Meyers MD

## 2019-10-24 NOTE — TELEPHONE ENCOUNTER
Panel Management Review      Patient has the following on her problem list: None      Composite cancer screening  Chart review shows that this patient is due/due soon for the following Colonoscopy  Summary:    Patient is due/failing the following:   COLONOSCOPY, MAMMOGRAM, PAP and PHYSICAL    Action needed:   Patient needs office visit for Physical and pap.  Schedule mammogram and colonoscopy  Type of outreach:    Sent letter.    Questions for provider review:    None                                                                                                                                    Suzanne Curtis CMA

## 2020-02-24 ENCOUNTER — TELEPHONE (OUTPATIENT)
Dept: FAMILY MEDICINE | Facility: CLINIC | Age: 50
End: 2020-02-24

## 2020-02-24 NOTE — LETTER
[address] [address]  [phone]  February 24, 2020    Vianney Sheldon  [address] [facility] [address]    Dear Vianney,    We care about your health and have reviewed your health plan including your medical conditions, medication list, and lab results.  Based on this review, it is recommended that you follow up regarding the following health topic(s):     -Breast Cancer Screening  -Colon Cancer Screening  -Cervical Cancer Screening  -Wellness (Physical) Visit   -Tobacco use    We recommend you take the following action(s):  -schedule a PAP SMEAR EXAM which is due.  Please disregard this reminder if you have had this exam elsewhere within the last year.  It would be helpful for us to have a copy of your recent pap smear report to update your records.    Please call us at [phone] (or use [email or web address]) to address the above recommendations.     Thank you for trusting [facility] and we appreciate the opportunity to serve you.  We look forward to supporting your healthcare needs in the future.    Healthy Regards,      Your Health Care Team  [facility]

## 2020-02-24 NOTE — TELEPHONE ENCOUNTER
Panel Management Review          Composite cancer screening  Chart review shows that this patient is due/due soon for the following Pap Smear, Mammogram and Colonoscopy  Summary:    Patient is due/failing the following:   COLONOSCOPY, MAMMOGRAM, PAP and PHYSICAL    Action needed:   Patient needs office visit for physical/pap/med check with fasting labs. Will address need for mammogram,colonoscopy and tobacco cessation at visit.    Type of outreach:    Sent letter.    Questions for provider review:    None                                                                                                                                    Stephanie Merida CMA

## 2022-01-02 ENCOUNTER — HEALTH MAINTENANCE LETTER (OUTPATIENT)
Age: 52
End: 2022-01-02

## 2022-03-28 ENCOUNTER — OFFICE VISIT (OUTPATIENT)
Dept: URGENT CARE | Facility: URGENT CARE | Age: 52
End: 2022-03-28
Payer: COMMERCIAL

## 2022-03-28 VITALS
SYSTOLIC BLOOD PRESSURE: 115 MMHG | OXYGEN SATURATION: 99 % | DIASTOLIC BLOOD PRESSURE: 78 MMHG | TEMPERATURE: 98.6 F | HEART RATE: 88 BPM | BODY MASS INDEX: 16.79 KG/M2 | WEIGHT: 97.8 LBS

## 2022-03-28 DIAGNOSIS — J40 BRONCHITIS: Primary | ICD-10-CM

## 2022-03-28 DIAGNOSIS — R06.2 WHEEZING: ICD-10-CM

## 2022-03-28 DIAGNOSIS — Z72.0 TOBACCO ABUSE: ICD-10-CM

## 2022-03-28 DIAGNOSIS — J32.9 VIRAL SINUSITIS: ICD-10-CM

## 2022-03-28 DIAGNOSIS — B97.89 VIRAL SINUSITIS: ICD-10-CM

## 2022-03-28 DIAGNOSIS — J22 LOWER RESPIRATORY TRACT INFECTION: ICD-10-CM

## 2022-03-28 PROCEDURE — U0003 INFECTIOUS AGENT DETECTION BY NUCLEIC ACID (DNA OR RNA); SEVERE ACUTE RESPIRATORY SYNDROME CORONAVIRUS 2 (SARS-COV-2) (CORONAVIRUS DISEASE [COVID-19]), AMPLIFIED PROBE TECHNIQUE, MAKING USE OF HIGH THROUGHPUT TECHNOLOGIES AS DESCRIBED BY CMS-2020-01-R: HCPCS | Performed by: NURSE PRACTITIONER

## 2022-03-28 PROCEDURE — U0005 INFEC AGEN DETEC AMPLI PROBE: HCPCS | Performed by: NURSE PRACTITIONER

## 2022-03-28 PROCEDURE — 99214 OFFICE O/P EST MOD 30 MIN: CPT | Performed by: NURSE PRACTITIONER

## 2022-03-28 RX ORDER — ALBUTEROL SULFATE 90 UG/1
2 AEROSOL, METERED RESPIRATORY (INHALATION) EVERY 6 HOURS PRN
Qty: 8.5 G | Refills: 0 | Status: SHIPPED | OUTPATIENT
Start: 2022-03-28 | End: 2022-06-14

## 2022-03-28 RX ORDER — AZITHROMYCIN 250 MG/1
TABLET, FILM COATED ORAL
Qty: 6 TABLET | Refills: 0 | Status: SHIPPED | OUTPATIENT
Start: 2022-03-28 | End: 2022-04-02

## 2022-03-28 RX ORDER — BENZONATATE 100 MG/1
100 CAPSULE ORAL 3 TIMES DAILY PRN
Qty: 30 CAPSULE | Refills: 0 | Status: SHIPPED | OUTPATIENT
Start: 2022-03-28 | End: 2022-06-14

## 2022-03-28 NOTE — PROGRESS NOTES
Assessment & Plan     Bronchitis    Lower respiratory tract infection    - azithromycin (ZITHROMAX) 250 MG tablet  Dispense: 6 tablet; Refill: 0  - benzonatate (TESSALON) 100 MG capsule  Dispense: 30 capsule; Refill: 0  - Symptomatic; Unknown COVID-19 Virus (Coronavirus) by PCR Nose    Wheezing    - albuterol (PROAIR HFA/PROVENTIL HFA/VENTOLIN HFA) 108 (90 Base) MCG/ACT inhaler  Dispense: 8.5 g; Refill: 0    Tobacco abuse    Viral sinusitis       There were no signs of pneumonia currently. Your symptoms are most likely due to acute bronchitis.  This is inflammation of the tubes leading into the lungs, most often due to a viral infection. Due to immunosuppression with tobacco use, prescription sent to pharmacy for azithromycin daily for 5 days. I have prescribed an albuterol inhaler as needed to help with the cough/wheezing. May take Tessalon Perles as needed for cough. Recommended rest, fluids especially warm clear liquids such as tea with honey and lemon, decreasing dairy which can increase congestion, humidifier, steam, nasal irrigation with saline, flonase nasal spray. COVID testing in process, will notify if positive. Self-quarantine recommended, letter provided. Low suspicion for PE with stable VS. Tobacco cessation recommended. Recommend appointment with PCP for anxiety and depression to discuss medication management, as Lexapro needs ongoing monitoring and no PCP appointment on file in past couple years.       Follow-up with PCP if symptoms persist for 7 days, and sooner if symptoms worsen or new symptoms develop.     Discussed red flag symptoms which warrant immediate visit in emergency room    All questions were answered and patient verbalized understanding. AVS reviewed with patient.     Tia Bermudez, DNP, APRN, CNP 3/28/2022 12:21 PM  [facility] [place]            Haja Faith is a 51 year old female who presents to clinic today for the following health issues:  Chief  Complaint   Patient presents with     Sick     Sx started 1 week ago with chest cold sx, head cold sx started friday. Tried thera cold, sudafed, nasal spray, vicks. Steam shower helps.     Patient presents for evaluation of cough. Associated symptoms: shortness of breath, wheezing, chills, nasal congestion, chest tightness, frontal headache and sinus pain. Cough is occasionally dry and productive. Cough hasn't been waking her at night. Denies fever, ear pain, teeth pain. Symptoms have been present for a week and have been worsening. She has a history of tobacco abuse and bronchitis. She has tried theracold, Sudafed, Vicks, steam showers which help temporarily. Also, she would like a refill of Lexapro which she has been out of for awhile.     Problem list, Medication list, Allergies, and Medical history reviewed in EPIC.    ROS:  Review of systems negative except for noted above    Objective    /78   Pulse 88   Temp 98.6  F (37  C) (Tympanic)   Wt 44.4 kg (97 lb 12.8 oz)   SpO2 99%   BMI 16.79 kg/m    Physical Exam  Constitutional:       General: She is not in acute distress.     Appearance: She is not toxic-appearing or diaphoretic.   HENT:      Head: Normocephalic and atraumatic.      Right Ear: Tympanic membrane, ear canal and external ear normal.      Left Ear: Tympanic membrane, ear canal and external ear normal.      Nose:      Right Sinus: No maxillary sinus tenderness or frontal sinus tenderness.      Left Sinus: No maxillary sinus tenderness or frontal sinus tenderness.      Comments: Moderate nasal congestion     Mouth/Throat:      Mouth: Mucous membranes are moist.      Pharynx: Oropharynx is clear. No oropharyngeal exudate or posterior oropharyngeal erythema.      Tonsils: No tonsillar abscesses. 0 on the right. 0 on the left.   Eyes:      Conjunctiva/sclera: Conjunctivae normal.   Cardiovascular:      Rate and Rhythm: Normal rate and regular rhythm.      Heart sounds: Normal heart sounds.    Pulmonary:      Effort: No respiratory distress.      Breath sounds: Wheezing present. No rhonchi or rales.   Lymphadenopathy:      Cervical: No cervical adenopathy.   Skin:     General: Skin is warm and dry.   Neurological:      Mental Status: She is alert.

## 2022-03-28 NOTE — PATIENT INSTRUCTIONS
Recommended rest, fluids especially warm clear liquids such as tea with honey and lemon, decreasing dairy which can increase congestion, humidifier, steam, nasal irrigation with saline, flonase nasal spray    Patient Education     Bronchitis, Antibiotic Treatment (Adult)    Bronchitis is an infection of the air passages (bronchial tubes) in your lungs. It often occurs when you have a cold. This illness is contagious during the first few days and is spread through the air by coughing and sneezing, or by direct contact (touching the sick person and then touching your own eyes, nose, or mouth).  Symptoms of bronchitis include cough with mucus (phlegm) and low-grade fever. Bronchitis usually lasts 7 to 14 days. Mild cases can be treated with simple home remedies. More severe infection is treated with an antibiotic.  Home care  Follow these guidelines when caring for yourself at home:    If your symptoms are severe, rest at home for the first 2 to 3 days. When you go back to your usual activities, don't let yourself get too tired.    Don't smoke. Also stay away from secondhand smoke.    You may use over-the-counter medicines to control fever or pain, unless another medicine was prescribed. If you have chronic liver or kidney disease or have ever had a stomach ulcer or gastrointestinal bleeding, talk with your healthcare provider before using these medicines. Also talk to your provider if you are taking medicine to prevent blood clots. Aspirin should never be given to anyone younger than 18 who is ill with a viral infection or fever. It may cause severe liver or brain damage.    Your appetite may be low, so a light diet is fine. Stay well hydrated by drinking 6 to 8 glasses of fluids per day. This includes water, soft drinks, sports drinks, juices, tea, or soup. Extra fluids will help loosen mucus in your nose and lungs.    Over-the-counter cough, cold, and sore-throat medicines will not shorten the length of the illness,  but they may be helpful to reduce your symptoms. Don't use decongestants if you have high blood pressure.    Finish all antibiotic medicine. Do this even if you are feeling better after only a few days.  Follow-up care  Follow up with your healthcare provider, or as advised. If you had an X-ray or ECG (electrocardiogram), a specialist will review it. You will be told of any new test results that may affect your care.  If you are age 65 or older, if you smoke, or if you have a chronic lung disease or condition that affects your immune system, ask your healthcare provider about getting a pneumococcal vaccine and a yearly flu shot (influenza vaccine).  When to seek medical advice  Call your healthcare provider right away if any of these occur:    Fever of 100.4 F (38 C) or higher, or as directed by your healthcare provider    Coughing up more sputum    Weakness, drowsiness, headache, facial pain, ear pain, or a stiff neck  Call 911  Call 911 if any of these occur.    Coughing up blood    Weakness, drowsiness, headache, or stiff neck that get worse    Trouble breathing, wheezing, or pain with breathing  [employer] last reviewed this educational content on 6/1/2018 2000-2021 [employer]. All rights reserved. This information is not intended as a substitute for professional medical care. Always follow your healthcare professional's instructions.

## 2022-03-29 LAB — SARS-COV-2 RNA RESP QL NAA+PROBE: NEGATIVE

## 2022-05-18 ENCOUNTER — VIRTUAL VISIT (OUTPATIENT)
Dept: FAMILY MEDICINE | Facility: CLINIC | Age: 52
End: 2022-05-18
Payer: COMMERCIAL

## 2022-05-18 DIAGNOSIS — R06.2 WHEEZING: ICD-10-CM

## 2022-05-18 DIAGNOSIS — U07.1 INFECTION DUE TO 2019 NOVEL CORONAVIRUS: Primary | ICD-10-CM

## 2022-05-18 DIAGNOSIS — Z72.0 TOBACCO ABUSE: ICD-10-CM

## 2022-05-18 DIAGNOSIS — J40 BRONCHITIS: ICD-10-CM

## 2022-05-18 PROCEDURE — 99214 OFFICE O/P EST MOD 30 MIN: CPT | Mod: 95 | Performed by: FAMILY MEDICINE

## 2022-05-18 RX ORDER — IPRATROPIUM BROMIDE AND ALBUTEROL SULFATE 2.5; .5 MG/3ML; MG/3ML
1 SOLUTION RESPIRATORY (INHALATION) EVERY 6 HOURS PRN
Qty: 90 ML | Refills: 0 | Status: SHIPPED | OUTPATIENT
Start: 2022-05-18 | End: 2022-05-19

## 2022-05-18 RX ORDER — PREDNISONE 20 MG/1
TABLET ORAL
Qty: 15 TABLET | Refills: 0 | Status: SHIPPED | OUTPATIENT
Start: 2022-05-18 | End: 2022-11-29

## 2022-05-18 RX ORDER — AZITHROMYCIN 250 MG/1
TABLET, FILM COATED ORAL
Qty: 6 TABLET | Refills: 0 | Status: SHIPPED | OUTPATIENT
Start: 2022-05-18 | End: 2022-05-23

## 2022-05-18 NOTE — LETTER
[facility]  [address], [address]  [place] [address]  Phone: [phone]    May 18, 2022        Vianney Sheldon  [address] [facility] [address]          To whom it may concern:    RE: Vianney Sheldon    Please excuse Vianney from work while she is still recovering from a COVID-19 infection.  I recommend that she hold off on returning to work until her symptoms have improved and her COVID-19 antigen test results are negative.    Please contact me for questions or concerns.      Sincerely,        Darin Peter, DO

## 2022-05-18 NOTE — PROGRESS NOTES
DME (Durable Medical Equipment) Orders and Documentation  Orders Placed This Encounter   Procedures     Nebulizer and Supplies Order      The patient was assessed and it was determined the patient is in need of the following listed DME Supplies/Equipment. Please complete supporting documentation below to demonstrate medical necessity.      Nebulizer Documentation  The patient was seen 05/18/2022. After assessment, the patient will need to be treated with ongoing nebulizer for treatment/management of wheezing/bronchitis symptoms.

## 2022-05-18 NOTE — PROGRESS NOTES
Vianney is a 51 year old who is being evaluated via a billable telephone visit.      What phone number would you like to be contacted at? [phone]  How would you like to obtain your AVS? Mail a copy    Assessment & Plan     Infection due to 2019 novel coronavirus  She tested positive for COVID-19 on 4/6/2022, but is still struggling with coughing and wheezing symptoms.    Wheezing  She was given a prescription for DuoNebs and prednisone taper to help improve the symptoms.  Smoking cessation would also likely help.  - Nebulizer and Supplies Order  - ipratropium - albuterol 0.5 mg/2.5 mg/3 mL (DUONEB) 0.5-2.5 (3) MG/3ML neb solution; Take 1 vial (3 mLs) by nebulization every 6 hours as needed for shortness of breath / dyspnea or wheezing  - predniSONE (DELTASONE) 20 MG tablet; Take 2 tablets (40 mg) daily x5 days.  Then take 1 tablet (20 mg) daily x5 days.    Addendum 5/19/2022: It appears that insurance will not cover the DuoNeb without a formal diagnosis of COPD or asthma.  I switched this to an albuterol neb.  Hopefully this will be covered.    Bronchitis  He was given a prescription for azithromycin which was helpful for bronchitis infection she had a couple of months ago.  If symptoms do not improve she will return to the clinic.  - azithromycin (ZITHROMAX) 250 MG tablet; Take 2 tablets (500 mg) by mouth daily for 1 day, THEN 1 tablet (250 mg) daily for 4 days.    Tobacco abuse  We discussed smoking cessation and I encouraged her to quit.                     Return in about 2 weeks (around 6/1/2022) for Follow up if symptoms not improving..    Darin Peter, [facility]   Vianney is a 51 year old who presents for the following health issues     HPI     6th of May tested positive for covid family had it, however she is not getting better  COVID-19 Symptom Review  How many days ago did these symptoms start? May 6th tested postive for covid but she's not getting  better    Are any of the following symptoms significant for you?  New or worsening difficulty breathing? Yes.  She describes having increased wheezing and chest tightness.  She has a long history of smoking and is trying to cut back during this illness.  She has needed prednisone and inhalers in the past.  She feels like she needs this type of treatment again.  In March she had bronchitis and reports that the Z-Ray prescribed at that time was also very helpful.    Worsening cough? Yes, it's a dry cough.     Fever or chills? No    Headache: no    Sore throat: no    Chest pain: no    Diarrhea: YES    Body aches? no    What treatments has patient tried? Acetaminophen   Does patient live in a nursing home, group home, or shelter? no  Does patient have a way to get food/medications during quarantined? Yes, I have a friend or family member who can help me.              Review of Systems   Constitutional, HEENT, cardiovascular, pulmonary, gi and gu systems are negative, except as otherwise noted.      Objective           Vitals:  No vitals were obtained today due to virtual visit.    Physical Exam   alert, no distress and fatigued  PSYCH: Alert and oriented times 3; coherent speech, normal   rate and volume, able to articulate logical thoughts, able   to abstract reason, no tangential thoughts, no hallucinations   or delusions  Her affect is normal  RESP: No cough, no audible wheezing, able to talk in full sentences  Remainder of exam unable to be completed due to telephone visits                Phone call duration: 12 minutes

## 2022-05-19 ENCOUNTER — TELEPHONE (OUTPATIENT)
Dept: FAMILY MEDICINE | Facility: CLINIC | Age: 52
End: 2022-05-19
Payer: COMMERCIAL

## 2022-05-19 RX ORDER — ALBUTEROL SULFATE 0.83 MG/ML
2.5 SOLUTION RESPIRATORY (INHALATION) EVERY 6 HOURS PRN
Qty: 90 ML | Refills: 0 | Status: SHIPPED | OUTPATIENT
Start: 2022-05-19 | End: 2022-07-12

## 2022-05-19 NOTE — TELEPHONE ENCOUNTER
Patient Quality Outreach    Patient is due for the following:   Mammogram, pap smear, colonoscopy    NEXT STEPS:   - Complete colonoscopy and mammogram  - Schedule physical/pap with fasting labs. We can address need for vaccines at visit    Type of outreach:    Sent [employer] message.      Questions for provider review:    None     Stephanie Gloria, [facility]

## 2022-05-20 ENCOUNTER — DOCUMENTATION ONLY (OUTPATIENT)
Dept: FAMILY MEDICINE | Facility: CLINIC | Age: 52
End: 2022-05-20
Payer: COMMERCIAL

## 2022-05-20 DIAGNOSIS — J06.9 ACUTE RESPIRATORY DISEASE: Primary | ICD-10-CM

## 2022-06-14 ENCOUNTER — OFFICE VISIT (OUTPATIENT)
Dept: FAMILY MEDICINE | Facility: CLINIC | Age: 52
End: 2022-06-14
Payer: COMMERCIAL

## 2022-06-14 VITALS
SYSTOLIC BLOOD PRESSURE: 102 MMHG | HEART RATE: 115 BPM | BODY MASS INDEX: 15.67 KG/M2 | TEMPERATURE: 98.5 F | HEIGHT: 64 IN | WEIGHT: 91.8 LBS | OXYGEN SATURATION: 98 % | DIASTOLIC BLOOD PRESSURE: 60 MMHG

## 2022-06-14 DIAGNOSIS — F41.9 ANXIETY: ICD-10-CM

## 2022-06-14 DIAGNOSIS — F17.200 NICOTINE DEPENDENCE, UNCOMPLICATED, UNSPECIFIED NICOTINE PRODUCT TYPE: ICD-10-CM

## 2022-06-14 DIAGNOSIS — Z12.11 SCREEN FOR COLON CANCER: ICD-10-CM

## 2022-06-14 DIAGNOSIS — Z23 ENCOUNTER FOR IMMUNIZATION: ICD-10-CM

## 2022-06-14 DIAGNOSIS — R63.6 UNDERWEIGHT: ICD-10-CM

## 2022-06-14 DIAGNOSIS — Z12.31 VISIT FOR SCREENING MAMMOGRAM: ICD-10-CM

## 2022-06-14 DIAGNOSIS — Z72.0 TOBACCO ABUSE: ICD-10-CM

## 2022-06-14 DIAGNOSIS — R06.2 WHEEZING: ICD-10-CM

## 2022-06-14 DIAGNOSIS — Z86.16 HISTORY OF COVID-19: ICD-10-CM

## 2022-06-14 PROCEDURE — 90715 TDAP VACCINE 7 YRS/> IM: CPT | Performed by: FAMILY MEDICINE

## 2022-06-14 PROCEDURE — 90677 PCV20 VACCINE IM: CPT | Performed by: FAMILY MEDICINE

## 2022-06-14 PROCEDURE — 90471 IMMUNIZATION ADMIN: CPT | Performed by: FAMILY MEDICINE

## 2022-06-14 PROCEDURE — 99406 BEHAV CHNG SMOKING 3-10 MIN: CPT | Mod: 25 | Performed by: FAMILY MEDICINE

## 2022-06-14 PROCEDURE — 99214 OFFICE O/P EST MOD 30 MIN: CPT | Mod: 25 | Performed by: FAMILY MEDICINE

## 2022-06-14 PROCEDURE — 90472 IMMUNIZATION ADMIN EACH ADD: CPT | Performed by: FAMILY MEDICINE

## 2022-06-14 RX ORDER — BUPROPION HYDROCHLORIDE 150 MG/1
150 TABLET ORAL EVERY MORNING
Qty: 30 TABLET | Refills: 0 | Status: SHIPPED | OUTPATIENT
Start: 2022-06-14 | End: 2022-07-12

## 2022-06-14 RX ORDER — NICOTINE 21 MG/24HR
1 PATCH, TRANSDERMAL 24 HOURS TRANSDERMAL EVERY 24 HOURS
Qty: 42 PATCH | Refills: 0 | Status: SHIPPED | OUTPATIENT
Start: 2022-06-14 | End: 2022-07-26

## 2022-06-14 RX ORDER — NICOTINE 21 MG/24HR
1 PATCH, TRANSDERMAL 24 HOURS TRANSDERMAL EVERY 24 HOURS
Qty: 28 PATCH | Refills: 0 | Status: SHIPPED | OUTPATIENT
Start: 2022-07-26 | End: 2022-08-23

## 2022-06-14 RX ORDER — ALBUTEROL SULFATE 90 UG/1
2 AEROSOL, METERED RESPIRATORY (INHALATION) EVERY 6 HOURS PRN
Qty: 8.5 G | Refills: 3 | Status: SHIPPED | OUTPATIENT
Start: 2022-06-14 | End: 2023-04-14

## 2022-06-14 ASSESSMENT — PAIN SCALES - GENERAL: PAINLEVEL: NO PAIN (0)

## 2022-06-14 NOTE — PATIENT INSTRUCTIONS
Zyban / Wellbutrin    Dosing:    Before Your Quit Date: Dose   Days 1-3 Take 150 mg by mouth once daily in the morning.   Days 4-7 (or up to 4-14 days) Take 150 mg by mouth twice daily in the morning and evening.   After Your Quit Date:    Treatment usually continues 7-12 weeks Take 150 mg by mouth twice daily in the morning and evening.       Some tips:    You will start taking bupropion at least 1 week before quitting smoking. It is okay to smoke while using bupropion.     You can use nicotine replacement therapy such as nicotine gum while using  Bupropion.     Take your 2 daily doses at least 8 hours apart.     DO NOT CRUSH OR BREAK TABLETS. Swallow the tablet whole.     You can take your medication with or without food.     Do not drink alcohol while taking this medication.     Side Effects:    Some people may experience dry mouth and insomnia. These may resolve in time.     Small frequent meals, frequent mouth care, chewing gum, or sucking lozenges may help with dry mouth.     Other possible rare side effects include constipation, nausea, headache, drowsiness, and weight loss may occur.     If you experience any other side effects that are troublesome, or if you feel something is not right, call your health care professional.           Nicotine Patch 21 mg  Uses  For quitting smoking.  Instructions  DO NOT take this medicine by mouth.  Avoid placing the patch near the breast.  Remove the patch after 24 hours.  Keep the medicine at room temperature. Avoid heat and direct light.  This patch should not be cut.  Wash your hands before and after handling this medicine.  Remove old patch before applying new one. Change the location of the new patch.  If you have vivid dreams or trouble sleeping, you may remove the patch before going to sleep.  Ask your doctor or pharmacist about locations on your body where this patch can be used.  Remove the plastic liner that protects the sticky side of the patch before applying  to the skin.  Be sure the area of skin is clean and dry before putting on a new patch.  Apply the patch to a clean, dry, hairless area.  Press the patch firmly for a few seconds to make sure it stays in place.  After removing the patch, fold it together and discard it out of reach of children and pets.  Please ask your doctor or pharmacist how you can safely dispose of used patches.  If the skin under the patch becomes irritated, remove the patch. Do not apply a new patch to the area until the skin feels better.  To avoid irritating your skin, use a different location for a new patch.  Apply the patch only to normal looking skin. Avoid areas of the skin that are red, have scrapes, or damaged.  If the patch falls off, apply a new a patch on a different location of the body.  Please tell your doctor and pharmacist about all the medicines you take. Include both prescription and over-the-counter medicines. Also tell them about any vitamins, herbal medicines, or anything else you take for your health.  If you need to stop this medicine, your doctor may wish to gradually reduce the dosage before stopping.  Do not use more than 1 patch at any one time.  Cautions  Tell your doctor and pharmacist if you ever had an allergic reaction to a medicine. Symptoms of an allergic reaction can include trouble breathing, skin rash, itching, swelling, or severe dizziness.  Do not use the medication any more than instructed.  Avoid smoking while on this medicine. Smoking may increase your risk for stroke, heart attack, blood clots, high blood pressure, and other diseases of the heart and blood vessels.  Tell the doctor or pharmacist if you are pregnant, planning to be pregnant, or breastfeeding.  Ask your pharmacist if this medicine can interact with any of your other medicines. Be sure to tell them about all the medicines you take.  Please tell all your doctors and dentists that you are on this medicine before they provide care.  Side  Effects  The following is a list of some common side effects from this medicine. Please speak with your doctor about what you should do if you experience these or other side effects.    skin irritation where medicine is applied  If you have any of the following side effects, you may be getting too much medicine. Please contact your doctor to let them know about these side effects.    diarrhea    dizziness    nausea    rapid heartbeat    vomiting  A few people may have an allergic reactions to this medicine. Symptoms can include difficulty breathing, skin rash, itching, swelling, or severe dizziness. If you notice any of these symptoms, seek medical help quickly.  Extra  Please speak with your doctor, nurse, or pharmacist if you have any questions about this medicine.  [email or web address]  IMPORTANT NOTE: This document tells you briefly how to take your medicine, but it does not tell you all there is to know about it.Your doctor or pharmacist may give you other documents about your medicine. Please talk to them if you have any questions.Always follow their advice. There is a more complete description of this medicine available in English.Scan this code on your smartphone or tablet or use the web address below. You can also ask your pharmacist for a printout. If you have any questions, please ask your pharmacist.     2021 [employer]        Nicotine Patch    Dosing:    >10 cigarettes per day Dose   Weeks 1-6 Use one 21 mg patch per day.   Weeks 7-8 Use one 14 mg patch per day.   Weeks 9-10 Use one 7 mg patch per day   <10 cigarettes per day  Weeks 1-6 Use one 14 mg patch per day   Weeks 7-8 Use one 7 mg patch per day       How to use the Nicotine Patch:    Apply a new patch to non-hairy, clean, dry skin on the upper body or upper outer arm.  Remove backing from patch and press on skin.  Hold for 10 seconds.    Apply patch around the same time every day.    Wash your hands after  applying the patch.    Remove the patch at the end of the day before you go to bed.    Apply a new patch the next morning.    Do not apply the patch to an area where you have worn a patch in the last week.   This will help prevent or reduce skin irritation.    Some Tips:    Do not smoke while you are using the nicotine patch!    Do not cut the nicotine patch -it will be ineffective.    Remove the patch prior to receiving an MRI since the patch may contain some metal.    Do not put the patch on irritated, cut, or burned skin.    If the patch falls off and cannot be reapplied, put on a new patch.    Follow -up with your health care professional if you have any questions and also to help taper your nicotine patch dose.    Side Effects:  Some people experience some skin irritation where the patch was placed.  Moving around the site where you are putting the patch each day should help.  If you experience any other troublesome or unusual side effects, call your health care professional.

## 2022-06-14 NOTE — PROGRESS NOTES
Assessment & Plan     History of COVID-19  Doing better    Nicotine dependence, uncomplicated, unspecified nicotine product type  SEE EPIC care orders  The potential side effects of this medication have been discussed with the patient.  Call if any significant problems with these are experienced.    - [place] Quit Partner Referral; Future  - nicotine (NICODERM CQ) 21 MG/24HR 24 hr patch; Place 1 patch onto the skin every 24 hours for 42 days  - nicotine (NICODERM CQ) 14 MG/24HR 24 hr patch; Place 1 patch onto the skin every 24 hours for 28 days  - nicotine (NICODERM CQ) 7 MG/24HR 24 hr patch; Place 1 patch onto the skin every 24 hours for 28 days  - SMOKING CESSATION COUNSELING 3-10 MIN    Tobacco abuse  Discussed needs to quit    Wheezing  refilled  - albuterol (PROAIR HFA/PROVENTIL HFA/VENTOLIN HFA) 108 (90 Base) MCG/ACT inhaler; Inhale 2 puffs into the lungs every 6 hours as needed for shortness of breath / dyspnea or wheezing    Underweight  Discussed needs to gain weight  She has Lost more weight since covid  SEE EPIC care orders  The potential side effects of this medication have been discussed with the patient.  Call if any significant problems with these are experienced.    - Nutritional Supplements (ENSURE COMPLETE) LIQD; Take 1 Can by mouth 2 times daily  - Nutritional Supplements (ENSURE COMPLETE) LIQD; Take 1 Can by mouth 2 times daily for 30 days    Anxiety  Discussed meds  Pt wants to Try wellbutrin as This may help with quitting smokin  She had some side effects with lexapro  I did see The TRISTON and PHQ9  No suicidal ideation or thoughts  Discussed Insomnia and risk of seizures  - buPROPion (WELLBUTRIN XL) 150 MG 24 hr tablet; Take 1 tablet (150 mg) by mouth every morning    Visit for screening mammogram  Advised   - MA Screening Digital Bilateral; Future    Screen for colon cancer  Advised   - COLOGUARD(EXACT SCIENCES)    Encounter for immunization  Advised   - Pneumococcal 20 Valent Conjugate (Prevnar  20)  Tobacco Cessation:   reports that she has been smoking. She has a 9.00 pack-year smoking history. She has never used smokeless tobacco.  Tobacco Cessation Action Plan: Pharmacotherapies : Zyban/Wellbutrin and Nicotine patch        Return in about 1 month (around 7/14/2022) for Physical Exam, mammogram.    Chhaya Martinez MD  [facility] HERMAN Faith is a 51 year old who presents for the following health issues   Pt had covid May 6th  Got nebs and zithromax  Pt is a smoker   Still gets wheezing off and on and wants refill of her inhaler  She is feeling better than before  She also wants to quit smoking    HPI       How are you feeling today? Much better  In the past 24 hours have you had shortness of breath when speaking, walking, or climbing stairs? My breathing issues have stayed the same only when carrying things  Do you have a cough? Cough is better  When is the last time you had a fever greater than 100? none  Are you having any other symptoms? None   Do you have any other stressors you would like to discuss with your provider? No    {Rooming staff  Consult with provider and complete PHQ if directed :[id number]}        PHQ Assesment Total Score(s) 5/18/2022   PHQ-2 Score 0   Some recent data might be hidden       TRISTON-7 Results 5/6/2019   TRISTON-7 Total Score 17   Some recent data might be hidden     Reviewed TRISTON and PHQ9 when in clinic  This was not abstracted   We will call Pt and do this again  Anxiety Follow-Up    How are you doing with your anxiety since your last visit? Has Not been taking meds    Are you having other symptoms that might be associated with anxiety? Yes:  see TRISTON    Have you had a significant life event? No     Are you feeling depressed?occasionally    Do you have any concerns with your use of alcohol or other drugs? No    Social History     Tobacco Use     Smoking status: Current Every Day Smoker     Packs/day: 0.50     Years: 18.00     Pack years: 9.00      "Smokeless tobacco: Never Used     Tobacco comment: down to 10 cig/day   Substance Use Topics     Alcohol use: Yes     Comment: rare/quit since preg     Drug use: No     TRISTON-7 SCORE 6/27/2017 5/6/2019   Total Score 19 17     PHQ 6/27/2017 5/6/2019   PHQ-9 Total Score 23 18   Q9: Thoughts of better off dead/self-harm past 2 weeks Several days Several days     Last PHQ-9 5/6/2019   1.  Little interest or pleasure in doing things 3   2.  Feeling down, depressed, or hopeless 1   3.  Trouble falling or staying asleep, or sleeping too much 1   4.  Feeling tired or having little energy 3   5.  Poor appetite or overeating 3   6.  Feeling bad about yourself 2   7.  Trouble concentrating 3   8.  Moving slowly or restless 1   Q9: Thoughts of better off dead/self-harm past 2 weeks 1   PHQ-9 Total Score 18     TRISTON-7  5/6/2019   1. Feeling nervous, anxious, or on edge 3   2. Not being able to stop or control worrying 3   3. Worrying too much about different things 2   4. Trouble relaxing 3   5. Being so restless that it is hard to sit still 2   6. Becoming easily annoyed or irritable 3   7. Feeling afraid, as if something awful might happen 1   TRISTON-7 Total Score 17   No suicidal ideation or thoughts      Review of Systems   CONSTITUTIONAL: NEGATIVE for fever, chills, change in weight  ENT/MOUTH: NEGATIVE for ear, mouth and throat problems  RESP: NEGATIVE for significant cough or SOB  CV: NEGATIVE for chest pain, palpitations or peripheral edema  GI: NEGATIVE for nausea, abdominal pain, heartburn, or change in bowel habits  PSYCHIATRIC: anxiety      Objective    /60   Pulse 115   Temp 98.5  F (36.9  C) (Tympanic)   Ht 1.621 m (5' 3.82\")   Wt 41.6 kg (91 lb 12.8 oz)   SpO2 98%   BMI 15.85 kg/m    Body mass index is 15.85 kg/m .  Physical Exam   GENERAL: healthy, alert and no distress  NECK: no adenopathy, no asymmetry, masses, or scars and thyroid normal to palpation  RESP: lungs clear to auscultation - no rales, rhonchi " or wheezes  CV: regular rate and rhythm, normal S1 S2, no S3 or S4, no murmur, click or rub, no peripheral edema and peripheral pulses strong  ABDOMEN: soft, nontender, no hepatosplenomegaly, no masses and bowel sounds normal  MS: no gross musculoskeletal defects noted, no edema  PSYCH: mentation appears normal, affect normal/bright

## 2022-06-15 ENCOUNTER — TELEPHONE (OUTPATIENT)
Dept: FAMILY MEDICINE | Facility: CLINIC | Age: 52
End: 2022-06-15

## 2022-06-15 NOTE — TELEPHONE ENCOUNTER
The patient's prescription for ensure that was ordered is not covered by her insurance. Vianney asked me to let you know and that you thought there might be another product you could prescribe that may be covered by insurance.  Thank You,  Chhaya Choi, [facility] [place]

## 2022-06-20 NOTE — TELEPHONE ENCOUNTER
Central Prior Authorization Team   Phone: [phone]    PA Initiation    Medication: Ensure  Insurance Company: [facility] - Phone [phone] Fax [phone]  Pharmacy Filling the Rx: [facility] - [address] [facility]  Filling Pharmacy Phone: [phone]  Filling Pharmacy Fax:    Start Date: 6/20/2022

## 2022-06-20 NOTE — TELEPHONE ENCOUNTER
PRIOR AUTHORIZATION DENIED    Medication: Ensure    Denial Date: 6/20/2022    Denial Rational:  Per insurance, medication is excluded from patient's benefit plan and will not be covered. Review and appeal are not available because of this exclusion.              Appeal Information:  N/A

## 2022-06-23 NOTE — TELEPHONE ENCOUNTER
Called patient and left message to call back.       Makayla AMBROSIO CMA ([facility])

## 2022-06-29 NOTE — TELEPHONE ENCOUNTER
I sent copy of prior authorization denial to patient through my chart. I called and left message for her to call back to complete the phq-9 and TRISTON-7.  Tita Augustin CMA

## 2022-07-12 ENCOUNTER — OFFICE VISIT (OUTPATIENT)
Dept: FAMILY MEDICINE | Facility: CLINIC | Age: 52
End: 2022-07-12
Payer: COMMERCIAL

## 2022-07-12 VITALS
WEIGHT: 96.6 LBS | SYSTOLIC BLOOD PRESSURE: 110 MMHG | DIASTOLIC BLOOD PRESSURE: 64 MMHG | HEART RATE: 71 BPM | HEIGHT: 64 IN | BODY MASS INDEX: 16.49 KG/M2 | OXYGEN SATURATION: 95 % | TEMPERATURE: 98.1 F

## 2022-07-12 DIAGNOSIS — F17.200 TOBACCO USE DISORDER: ICD-10-CM

## 2022-07-12 DIAGNOSIS — F41.9 ANXIETY: ICD-10-CM

## 2022-07-12 DIAGNOSIS — Z30.432 ENCOUNTER FOR IUD REMOVAL: ICD-10-CM

## 2022-07-12 DIAGNOSIS — R63.6 UNDERWEIGHT: ICD-10-CM

## 2022-07-12 DIAGNOSIS — Z11.59 NEED FOR HEPATITIS C SCREENING TEST: ICD-10-CM

## 2022-07-12 DIAGNOSIS — Z87.891 PERSONAL HISTORY OF TOBACCO USE: ICD-10-CM

## 2022-07-12 DIAGNOSIS — R06.2 WHEEZING: ICD-10-CM

## 2022-07-12 DIAGNOSIS — Z12.31 VISIT FOR SCREENING MAMMOGRAM: ICD-10-CM

## 2022-07-12 DIAGNOSIS — Z12.11 SCREEN FOR COLON CANCER: ICD-10-CM

## 2022-07-12 DIAGNOSIS — Z13.220 SCREENING FOR HYPERLIPIDEMIA: ICD-10-CM

## 2022-07-12 DIAGNOSIS — Z80.3 FAMILY HISTORY OF MALIGNANT NEOPLASM OF BREAST: ICD-10-CM

## 2022-07-12 DIAGNOSIS — Z12.4 CERVICAL CANCER SCREENING: ICD-10-CM

## 2022-07-12 DIAGNOSIS — F17.200 NICOTINE DEPENDENCE, UNCOMPLICATED, UNSPECIFIED NICOTINE PRODUCT TYPE: ICD-10-CM

## 2022-07-12 DIAGNOSIS — Z00.01 ENCOUNTER FOR ROUTINE ADULT PHYSICAL EXAM WITH ABNORMAL FINDINGS: Primary | ICD-10-CM

## 2022-07-12 LAB — ERYTHROCYTE [SEDIMENTATION RATE] IN BLOOD BY WESTERGREN METHOD: 7 MM/HR (ref 0–30)

## 2022-07-12 PROCEDURE — 80053 COMPREHEN METABOLIC PANEL: CPT | Performed by: FAMILY MEDICINE

## 2022-07-12 PROCEDURE — 99396 PREV VISIT EST AGE 40-64: CPT | Mod: 25 | Performed by: FAMILY MEDICINE

## 2022-07-12 PROCEDURE — 86803 HEPATITIS C AB TEST: CPT | Performed by: FAMILY MEDICINE

## 2022-07-12 PROCEDURE — 99406 BEHAV CHNG SMOKING 3-10 MIN: CPT | Performed by: FAMILY MEDICINE

## 2022-07-12 PROCEDURE — 58301 REMOVE INTRAUTERINE DEVICE: CPT | Performed by: FAMILY MEDICINE

## 2022-07-12 PROCEDURE — 99214 OFFICE O/P EST MOD 30 MIN: CPT | Mod: 25 | Performed by: FAMILY MEDICINE

## 2022-07-12 PROCEDURE — 87624 HPV HI-RISK TYP POOLED RSLT: CPT | Performed by: FAMILY MEDICINE

## 2022-07-12 PROCEDURE — 84134 ASSAY OF PREALBUMIN: CPT | Performed by: FAMILY MEDICINE

## 2022-07-12 PROCEDURE — 84443 ASSAY THYROID STIM HORMONE: CPT | Performed by: FAMILY MEDICINE

## 2022-07-12 PROCEDURE — 36415 COLL VENOUS BLD VENIPUNCTURE: CPT | Performed by: FAMILY MEDICINE

## 2022-07-12 PROCEDURE — 82306 VITAMIN D 25 HYDROXY: CPT | Performed by: FAMILY MEDICINE

## 2022-07-12 PROCEDURE — 85652 RBC SED RATE AUTOMATED: CPT | Performed by: FAMILY MEDICINE

## 2022-07-12 PROCEDURE — G0145 SCR C/V CYTO,THINLAYER,RESCR: HCPCS | Performed by: FAMILY MEDICINE

## 2022-07-12 PROCEDURE — 80061 LIPID PANEL: CPT | Performed by: FAMILY MEDICINE

## 2022-07-12 RX ORDER — ALBUTEROL SULFATE 0.83 MG/ML
2.5 SOLUTION RESPIRATORY (INHALATION) EVERY 6 HOURS PRN
Qty: 90 ML | Refills: 0 | Status: SHIPPED | OUTPATIENT
Start: 2022-07-12

## 2022-07-12 RX ORDER — BUPROPION HYDROCHLORIDE 150 MG/1
150 TABLET ORAL EVERY MORNING
Qty: 30 TABLET | Refills: 1 | Status: SHIPPED | OUTPATIENT
Start: 2022-07-12 | End: 2022-10-13

## 2022-07-12 ASSESSMENT — ENCOUNTER SYMPTOMS
HEMATOCHEZIA: 0
DIZZINESS: 0
DIARRHEA: 0
NAUSEA: 0
PALPITATIONS: 0
JOINT SWELLING: 0
SHORTNESS OF BREATH: 0
BREAST MASS: 0
COUGH: 0
ABDOMINAL PAIN: 0
WEAKNESS: 0
EYE PAIN: 0
CHILLS: 0
HEADACHES: 0
HEMATURIA: 0
SORE THROAT: 0
HEARTBURN: 0
CONSTIPATION: 0
ARTHRALGIAS: 0
FREQUENCY: 1
FEVER: 0
PARESTHESIAS: 0
NERVOUS/ANXIOUS: 0
DYSURIA: 0
MYALGIAS: 0

## 2022-07-12 ASSESSMENT — ANXIETY QUESTIONNAIRES
GAD7 TOTAL SCORE: 8
6. BECOMING EASILY ANNOYED OR IRRITABLE: SEVERAL DAYS
7. FEELING AFRAID AS IF SOMETHING AWFUL MIGHT HAPPEN: SEVERAL DAYS
3. WORRYING TOO MUCH ABOUT DIFFERENT THINGS: SEVERAL DAYS
GAD7 TOTAL SCORE: 8
2. NOT BEING ABLE TO STOP OR CONTROL WORRYING: SEVERAL DAYS
IF YOU CHECKED OFF ANY PROBLEMS ON THIS QUESTIONNAIRE, HOW DIFFICULT HAVE THESE PROBLEMS MADE IT FOR YOU TO DO YOUR WORK, TAKE CARE OF THINGS AT HOME, OR GET ALONG WITH OTHER PEOPLE: SOMEWHAT DIFFICULT
1. FEELING NERVOUS, ANXIOUS, OR ON EDGE: SEVERAL DAYS
5. BEING SO RESTLESS THAT IT IS HARD TO SIT STILL: SEVERAL DAYS

## 2022-07-12 ASSESSMENT — PATIENT HEALTH QUESTIONNAIRE - PHQ9
5. POOR APPETITE OR OVEREATING: MORE THAN HALF THE DAYS
SUM OF ALL RESPONSES TO PHQ QUESTIONS 1-9: 3

## 2022-07-12 ASSESSMENT — PAIN SCALES - GENERAL: PAINLEVEL: NO PAIN (0)

## 2022-07-12 NOTE — PROGRESS NOTES
Lung Cancer Screening Shared Decision Making Visit     Vianney Sheldon, a 51 year old female, is eligible for lung cancer screening    History   Smoking Status     Current Every Day Smoker     Packs/day: 1.00     Years: 36.00   Smokeless Tobacco     Never Used     Comment: down to 10 cig/day       I have discussed with patient the risks and benefits of screening for lung cancer with low-dose CT.     The risks include:    radiation exposure: one low dose chest CT has as much ionizing radiation as about 15 chest x-rays, or 6 months of background radiation living in [place]      false positives: most findings/nodules are NOT cancer, but some might still require additional diagnostic evaluation, including biopsy    over-diagnosis: some slow growing cancers that might never have been clinically significant will be detected and treated unnecessarily     The benefit of early detection of lung cancer is contingent upon adherence to annual screening or more frequent follow up if indicated.     Furthermore, to benefit from screening, Vianney must be willing and able to undergo diagnostic procedures, if indicated. Although no specific guide is available for determining severity of comorbidities, it is reasonable to withhold screening in patients who have greater mortality risk from other diseases.     We did discuss that the best way to prevent lung cancer is to not smoke.    Some patients may value a numeric estimation of lung cancer risk when evaluating if lung cancer screening is right for them, here is one calculator:    ShouldIScreen  Answers for HPI/ROS submitted by the patient on 7/12/2022  Frequency of exercise:: 4-5 days/week  Getting at least 3 servings of Calcium per day:: NO  Diet:: Regular (no restrictions)  Taking medications regularly:: Yes  Medication side effects:: Other  Bi-annual eye exam:: Yes  Dental care twice a year:: NO  Sleep apnea or symptoms of sleep apnea:: None  abdominal pain: No  Blood in  stool: No  Blood in urine: No  chest pain: No  chills: No  congestion: No  constipation: No  cough: No  diarrhea: No  dizziness: No  ear pain: No  eye pain: No  nervous/anxious: No  fever: No  frequency: Yes  genital sores: No  headaches: No  hearing loss: No  heartburn: No  arthralgias: No  joint swelling: No  peripheral edema: No  mood changes: No  myalgias: No  nausea: No  dysuria: No  palpitations: No  Skin sensation changes: No  sore throat: No  urgency: Yes  rash: No  shortness of breath: No  visual disturbance: No  weakness: No  pelvic pain: No  vaginal bleeding: No  vaginal discharge: No  tenderness: No  breast mass: No  breast discharge: No  Additional concerns today:: No  Duration of exercise:: Greater than 60 minutes      IUD Removal:  SUBJECTIVE:    Is a pregnancy test required: No.  Was a consent obtained?  Yes    Vianney Sheldon is a 51 year old female,, No LMP recorded. (Menstrual status: IUD). who presents today for IUD removal. Her current IUD was placed 13 years ago per patient ago. She has not had problems with the IUD. She requests removal of the IUD because the IUD effectiveness has     Today's PHQ-2 Score:   PHQ-2 (  [employer]) 2022   Q1: Little interest or pleasure in doing things 0   Q2: Feeling down, depressed or hopeless 1   PHQ-2 Score 1   PHQ-2 Total Score (12-17 Years)- Positive if 3 or more points; Administer PHQ-A if positive -   Q1: Little interest or pleasure in doing things Several days   Q2: Feeling down, depressed or hopeless Several days   PHQ-2 Score 2       PROCEDURE:    A speculum exam was performed and the cervix was visualized. The IUD string was visualized. Using ring forceps, the string  was grasped and the IUD removed intact.    POST PROCEDURE:    The patient tolerated the procedure well. Patient was discharged in stable condition.    Call if bleeding, pain or fever occur. and Birth control counseling given.    Chhaya Martinez MD

## 2022-07-12 NOTE — PROGRESS NOTES
SUBJECTIVE:   CC: Vianney Sheldon is an 51 year old woman who presents for preventive health visit.       Patient has been advised of split billing requirements and indicates understanding: Yes  Healthy Habits:     Getting at least 3 servings of Calcium per day:  NO    Bi-annual eye exam:  Yes    Dental care twice a year:  NO    Sleep apnea or symptoms of sleep apnea:  None    Diet:  Regular (no restrictions)    Frequency of exercise:  4-5 days/week    Duration of exercise:  Greater than 60 minutes    Taking medications regularly:  Yes    Medication side effects:  Other    PHQ-2 Total Score: 2    Additional concerns today:  No        Today's PHQ-2 Score:   PHQ-2 ( 1999 [employer]) 7/12/2022   Q1: Little interest or pleasure in doing things 0   Q2: Feeling down, depressed or hopeless 1   PHQ-2 Score 1   PHQ-2 Total Score (12-17 Years)- Positive if 3 or more points; Administer PHQ-A if positive -   Q1: Little interest or pleasure in doing things Several days   Q2: Feeling down, depressed or hopeless Several days   PHQ-2 Score 2       Abuse: Current or Past (Physical, Sexual or Emotional) - No  Do you feel safe in your environment? Yes    Have you ever done Advance Care Planning? (For example, a Health Directive, POLST, or a discussion with a medical provider or your loved ones about your wishes): No, advance care planning information given to patient to review.  Advanced care planning was discussed at today's visit.    Social History     Tobacco Use     Smoking status: Current Every Day Smoker     Packs/day: 1.00     Years: 36.00     Pack years: 36.00     Smokeless tobacco: Never Used     Tobacco comment: down to 10 cig/day   Substance Use Topics     Alcohol use: Yes     Comment: rare/quit since preg         Alcohol Use 7/12/2022   Prescreen: >3 drinks/day or >7 drinks/week? Not Applicable   Prescreen: >3 drinks/day or >7 drinks/week? -       Reviewed orders with patient.  Reviewed health maintenance and updated orders  accordingly - Yes  Lab work is in process  Labs reviewed in EPIC  BP Readings from Last 3 Encounters:   22 110/64   22 102/60   22 115/78    Wt Readings from Last 3 Encounters:   22 43.8 kg (96 lb 9.6 oz)   22 41.6 kg (91 lb 12.8 oz)   22 44.4 kg (97 lb 12.8 oz)                  Patient Active Problem List   Diagnosis     Family history of malignant neoplasm of breast     Smoker     Other specified aftercare following surgery     IUD     CARDIOVASCULAR SCREENING; LDL GOAL LESS THAN 160     Generalized anxiety disorder     Panic attack     Past Surgical History:   Procedure Laterality Date     SURGICAL HISTORY OF -   10/01/1986    D & C       Social History     Tobacco Use     Smoking status: Current Every Day Smoker     Packs/day: 1.00     Years: 36.00     Pack years: 36.00     Smokeless tobacco: Never Used     Tobacco comment: down to 10 cig/day   Substance Use Topics     Alcohol use: Yes     Comment: rare/quit since preg     Family History   Problem Relation Age of Onset     Cancer Father         throat cancer,  at 54, smoker     Breast Cancer Paternal Grandmother         dx in her 40's     Blood Disease Maternal Grandmother         anemia     Blood Disease Sister         anemia     Alcohol/Drug Father      Psychotic Disorder Maternal Grandmother         ? schizophrenia         Current Outpatient Medications   Medication Sig Dispense Refill     albuterol (PROAIR HFA/PROVENTIL HFA/VENTOLIN HFA) 108 (90 Base) MCG/ACT inhaler Inhale 2 puffs into the lungs every 6 hours as needed for shortness of breath / dyspnea or wheezing 8.5 g 3     albuterol (PROVENTIL) (2.5 MG/3ML) 0.083% neb solution Take 1 vial (2.5 mg) by nebulization every 6 hours as needed for shortness of breath / dyspnea or wheezing 90 mL 0     buPROPion (WELLBUTRIN XL) 150 MG 24 hr tablet Take 1 tablet (150 mg) by mouth every morning 30 tablet 1     [START ON 2022] nicotine (NICODERM CQ) 14 MG/24HR 24 hr patch  Place 1 patch onto the skin every 24 hours for 28 days (Patient not taking: Reported on 7/12/2022) 28 patch 0     nicotine (NICODERM CQ) 21 MG/24HR 24 hr patch Place 1 patch onto the skin every 24 hours for 42 days (Patient not taking: Reported on 7/12/2022) 42 patch 0     [START ON 8/23/2022] nicotine (NICODERM CQ) 7 MG/24HR 24 hr patch Place 1 patch onto the skin every 24 hours for 28 days (Patient not taking: Reported on 7/12/2022) 28 patch 0     Nutritional Supplements (ENSURE COMPLETE) LIQD Take 1 Can by mouth 2 times daily (Patient not taking: Reported on 7/12/2022) 296 mL 1     Nutritional Supplements (ENSURE COMPLETE) LIQD Take 1 Can by mouth 2 times daily for 30 days (Patient not taking: Reported on 7/12/2022) 237 mL 4     predniSONE (DELTASONE) 20 MG tablet Take 2 tablets (40 mg) daily x5 days.  Then take 1 tablet (20 mg) daily x5 days. (Patient not taking: No sig reported) 15 tablet 0     Allergies   Allergen Reactions     Flexeril [Cyclobenzaprine]      Leg twitching at night.       Breast Cancer Screening:    Breast CA Risk Assessment (FHS-7) 7/12/2022   Do you have a family history of breast, colon, or ovarian cancer? No / Unknown         discussed Importance of getting a mammogram  Pertinent mammograms are reviewed under the imaging tab.    History of abnormal Pap smear: NO - age 30-65 PAP every 5 years with negative HPV co-testing recommended  PAP / HPV 3/3/2008 8/10/2006 6/15/2005   PAP (Historical) NIL NIL NIL     Reviewed and updated as needed this visit by clinical staff   Tobacco  Allergies  Meds  Problems  Med Hx  Surg Hx  Fam Hx            Reviewed and updated as needed this visit by Provider   Tobacco  Allergies  Meds  Problems  Med Hx  Surg Hx  Fam Hx           Past Medical History:   Diagnosis Date     Depressive disorder, not elsewhere classified      Tobacco use disorder       Past Surgical History:   Procedure Laterality Date     SURGICAL HISTORY OF -   10/01/1986    D & C  "      Review of Systems   Constitutional: Negative for chills and fever.   HENT: Negative for congestion, ear pain, hearing loss and sore throat.    Eyes: Negative for pain and visual disturbance.   Respiratory: Negative for cough and shortness of breath.    Cardiovascular: Negative for chest pain, palpitations and peripheral edema.   Gastrointestinal: Negative for abdominal pain, constipation, diarrhea, heartburn, hematochezia and nausea.   Breasts:  Negative for tenderness, breast mass and discharge.   Genitourinary: Positive for frequency and urgency. Negative for dysuria, genital sores, hematuria, pelvic pain, vaginal bleeding and vaginal discharge.   Musculoskeletal: Negative for arthralgias, joint swelling and myalgias.   Skin: Negative for rash.   Neurological: Negative for dizziness, weakness, headaches and paresthesias.   Psychiatric/Behavioral: Negative for mood changes. The patient is not nervous/anxious.    Rest of the ROS is Negative except see above and Problem list [stable]     OBJECTIVE:   /64   Pulse 71   Temp 98.1  F (36.7  C) (Temporal)   Ht 1.636 m (5' 4.41\")   Wt 43.8 kg (96 lb 9.6 oz)   SpO2 95%   BMI 16.37 kg/m    Physical Exam  GENERAL: alert and no distress  EYES: Eyes grossly normal to inspection, PERRL and conjunctivae and sclerae normal  HENT: ear canals and TM's normal, nose and mouth without ulcers or lesions  NECK: no adenopathy, no asymmetry, masses, or scars and thyroid normal to palpation  RESP: lungs clear to auscultation - no rales, rhonchi or wheezes  BREAST: normal without masses, tenderness or nipple discharge and no palpable axillary masses or adenopathy  CV: regular rate and rhythm, normal S1 S2, no S3 or S4, no murmur, click or rub, no peripheral edema and peripheral pulses strong  ABDOMEN: soft, nontender, no hepatosplenomegaly, no masses and bowel sounds normal   (female): normal female external genitalia, normal urethral meatus, vaginal mucosa pink, moist, " well rugated, and normal cervix/adnexa/uterus without masses or discharge  MS: no gross musculoskeletal defects noted, no edema  SKIN: no suspicious lesions or rashes  NEURO: Normal strength and tone, mentation intact and speech normal  PSYCH: mentation appears normal, affect normal/bright    Diagnostic Test Results:  Labs reviewed in Epic  Pending     ASSESSMENT/PLAN:   (Z00.01) Encounter for routine adult physical exam with abnormal findings  (primary encounter diagnosis)  Comment:   Plan: Glucose            (R06.2) Wheezing  Comment: refilled  Plan: albuterol (PROVENTIL) (2.5 MG/3ML) 0.083% neb         solution        Doing well    (F41.9) Anxiety  Comment: stable   Plan: buPROPion (WELLBUTRIN XL) 150 MG 24 hr tablet            (Z30.432) Encounter for IUD removal  Comment: as above  Plan: Pt has had IUD for 13 years and wants This removed    (R63.6) Underweight  Comment: pending labs  Plan: Vitamin D Deficiency, Comprehensive metabolic         panel (BMP + Alb, Alk Phos, ALT, AST, Total.         Bili, TP), TSH with free T4 reflex, ESR:         Erythrocyte sedimentation rate        Discussed needs to eat 3 Times daily with snacks In between  Pt has gained weight since last visit    (Z12.11) Screen for colon cancer  Comment: advised colonoscopy or   Plan: Fecal colorectal cancer screen (FIT), CANCELED:        Colonscopy Screening  Referral            (F17.200) Smoker  Comment: pt is Trying to quit  Plan:     (Z80.3) Family history of malignant neoplasm of breast  Comment: discussed Importance of getting mammogram  Plan: and early detection    (Z12.31) Visit for screening mammogram  Comment: as above  Plan: MA Screening Digital Bilateral            (F17.200) Nicotine dependence, uncomplicated, unspecified nicotine product type  Comment: as above   Advised CT lung ca screen  Plan: SMOKING CESSATION COUNSELING 3-10 MIN, Prof         fee: Shared Decision Making for Lung Cancer         Screening, CT Chest Lung  "Cancer Scrn Low Dose         wo            (Z87.891) Personal history of tobacco use  Comment: as above  Plan:     (Z11.59) Need for hepatitis C screening test  Comment:   Plan: Hepatitis C Screen Reflex to HCV RNA Quant and         Genotype            (Z12.4) Cervical cancer screening  Comment:   Plan: Pap Screen with HPV - recommended age 30 - 65         years            (Z13.220) Screening for hyperlipidemia  Comment: pending   Plan: Lipid panel reflex to direct LDL Non-fasting            COUNSELING:  Reviewed preventive health counseling, as reflected in patient instructions       Healthy diet/nutrition       Vision screening       Hearing screening       Contraception       Osteoporosis prevention/bone health       Colorectal Cancer Screening       Consider Hep C screening for all patients one time for ages 18-79 years       Consider lung cancer screening for ages 55-80 years (77 for Medicare) and 20 pack-year smoking history        The ASCVD Risk score (Nancy ALVAREZ Jr., et al., 2013) failed to calculate for the following reasons:    Cannot find a previous HDL lab    Cannot find a previous total cholesterol lab       Advance Care Planning    Estimated body mass index is 16.37 kg/m  as calculated from the following:    Height as of this encounter: 1.636 m (5' 4.41\").    Weight as of this encounter: 43.8 kg (96 lb 9.6 oz).    Weight management plan pre-albumin level obtained and return visit in Other 2 months    She reports that she has been smoking. She has a 36.00 pack-year smoking history. She has never used smokeless tobacco.  Tobacco Cessation Action Plan:   Pharmacotherapies : Zyban/Wellbutrin      Counseling Resources:  ATP IV Guidelines  Pooled Cohorts Equation Calculator  Breast Cancer Risk Calculator  BRCA-Related Cancer Risk Assessment: FHS-7 Tool  FRAX Risk Assessment  ICSI Preventive Guidelines  Dietary Guidelines for Americans, 2010  USDA's MyPlate  ASA Prophylaxis  Lung CA Screening    Chhaya Martinez, " MD [facility]

## 2022-07-12 NOTE — PATIENT INSTRUCTIONS
Zyban / Wellbutrin    Dosing:    Before Your Quit Date: Dose   Days 1-3 Take 150 mg by mouth once daily in the morning.   Days 4-7 (or up to 4-14 days) Take 150 mg by mouth twice daily in the morning and evening.   After Your Quit Date:    Treatment usually continues 7-12 weeks Take 150 mg by mouth twice daily in the morning and evening.       Some tips:    You will start taking bupropion at least 1 week before quitting smoking. It is okay to smoke while using bupropion.     You can use nicotine replacement therapy such as nicotine gum while using  Bupropion.     Take your 2 daily doses at least 8 hours apart.     DO NOT CRUSH OR BREAK TABLETS. Swallow the tablet whole.     You can take your medication with or without food.     Do not drink alcohol while taking this medication.     Side Effects:    Some people may experience dry mouth and insomnia. These may resolve in time.     Small frequent meals, frequent mouth care, chewing gum, or sucking lozenges may help with dry mouth.     Other possible rare side effects include constipation, nausea, headache, drowsiness, and weight loss may occur.     If you experience any other side effects that are troublesome, or if you feel something is not right, call your health care professional.           Nicotine Patch 21 mg  Uses  For quitting smoking.  Instructions  DO NOT take this medicine by mouth.  Avoid placing the patch near the breast.  Remove the patch after 24 hours.  Keep the medicine at room temperature. Avoid heat and direct light.  This patch should not be cut.  Wash your hands before and after handling this medicine.  Remove old patch before applying new one. Change the location of the new patch.  If you have vivid dreams or trouble sleeping, you may remove the patch before going to sleep.  Ask your doctor or pharmacist about locations on your body where this patch can be used.  Remove the plastic liner that protects the sticky side of the patch before applying  to the skin.  Be sure the area of skin is clean and dry before putting on a new patch.  Apply the patch to a clean, dry, hairless area.  Press the patch firmly for a few seconds to make sure it stays in place.  After removing the patch, fold it together and discard it out of reach of children and pets.  Please ask your doctor or pharmacist how you can safely dispose of used patches.  If the skin under the patch becomes irritated, remove the patch. Do not apply a new patch to the area until the skin feels better.  To avoid irritating your skin, use a different location for a new patch.  Apply the patch only to normal looking skin. Avoid areas of the skin that are red, have scrapes, or damaged.  If the patch falls off, apply a new a patch on a different location of the body.  Please tell your doctor and pharmacist about all the medicines you take. Include both prescription and over-the-counter medicines. Also tell them about any vitamins, herbal medicines, or anything else you take for your health.  If you need to stop this medicine, your doctor may wish to gradually reduce the dosage before stopping.  Do not use more than 1 patch at any one time.  Cautions  Tell your doctor and pharmacist if you ever had an allergic reaction to a medicine. Symptoms of an allergic reaction can include trouble breathing, skin rash, itching, swelling, or severe dizziness.  Do not use the medication any more than instructed.  Avoid smoking while on this medicine. Smoking may increase your risk for stroke, heart attack, blood clots, high blood pressure, and other diseases of the heart and blood vessels.  Tell the doctor or pharmacist if you are pregnant, planning to be pregnant, or breastfeeding.  Ask your pharmacist if this medicine can interact with any of your other medicines. Be sure to tell them about all the medicines you take.  Please tell all your doctors and dentists that you are on this medicine before they provide care.  Side  Effects  The following is a list of some common side effects from this medicine. Please speak with your doctor about what you should do if you experience these or other side effects.    skin irritation where medicine is applied  If you have any of the following side effects, you may be getting too much medicine. Please contact your doctor to let them know about these side effects.    diarrhea    dizziness    nausea    rapid heartbeat    vomiting  A few people may have an allergic reactions to this medicine. Symptoms can include difficulty breathing, skin rash, itching, swelling, or severe dizziness. If you notice any of these symptoms, seek medical help quickly.  Extra  Please speak with your doctor, nurse, or pharmacist if you have any questions about this medicine.  [email or web address]  IMPORTANT NOTE: This document tells you briefly how to take your medicine, but it does not tell you all there is to know about it.Your doctor or pharmacist may give you other documents about your medicine. Please talk to them if you have any questions.Always follow their advice. There is a more complete description of this medicine available in English.Scan this code on your smartphone or tablet or use the web address below. You can also ask your pharmacist for a printout. If you have any questions, please ask your pharmacist.     2021 [employer].        HOW TO QUIT SMOKING  Smoking is one of the hardest habits to break. About half of all those who have ever smoked have been able to quit, and most of those (about 70%) who still smoke want to quit. Here are some of the best ways to stop smoking.     KEEP TRYING:  It takes most smokers about 8 tries before they are finally able to fully quit. So, the more often you try and fail, the better your chance of quitting the next time! So, don't give up!    GO COLD TURKEY:  Most ex-smokers quit cold turkey. Trying to cut back gradually doesn't seem to work  as well, perhaps because it continues the smoking habit. Also, it is possible to fool yourself by inhaling more while smoking fewer cigarettes. This results in the same amount of nicotine in your body!    GET SUPPORT:  Support programs can make an important difference, especially for the heavy smoker. These groups offer lectures, methods to change your behavior and peer support. Call the free national Quitline for more information. 800-QUIT-NOW (323-330-4540). Low-cost or free programs are offered by many hospitals, local chapters of the American Lung Association (399-545-8288) and the American Cancer Society (678-433-5708). Support at home is important too. Non-smokers can help by offering praise and encouragement. If the smoker fails to quit, encourage them to try again!    OVER-THE-COUNTER MEDICINES:  For those who can't quit on their own, Nicotine Replacement Therapy (NRT) may make quitting much easier. Certain aids such as the nicotine patch, gum and lozenge are available without a prescription. However, it is best to use these under the guidance of your doctor. The skin patch provides a steady supply of nicotine to the body. Nicotine gum and lozenge gives temporary bursts of low levels of nicotine. Both methods take the edge off the craving for cigarettes. WARNING: If you feel symptoms of nicotine overdose, such as nausea, vomiting, dizziness, weakness, or fast heartbeat, stop using these and see your doctor.    PRESCRIPTION MEDICINES:  After evaluating your smoking patterns and prior attempts at quitting, your doctor may offer a prescription medicine such as bupropion (Zyban, Wellbutrin), varenicline (Chantix, Champix), a niocotine inhaler or nasal spray. Each has its unique advantage and side effects which your doctor can review with you.    HEALTH BENEFITS OF QUITTING:  The benefits of quitting start right away and keep improving the longer you go without smokin minutes: blood pressure and pulse  return to normal  8 hours: oxygen levels return to normal  2 days: ability to smell and taste begins to improve as damaged nerves start to regrow  2-3 weeks: circulation and lung function improves  1-9 months: decreased cough, congestion and shortness of breath; less tired  1 year: risk of heart attack decreases by half  5 years: risk of lung cancer decreases by half; risk of stroke becomes the same as a non-smoker  For information about how to quit smoking, visit the following links:  [employer] [place] ,   Clearing the Air, Quit Smoking Today   - an online booklet. http://www.smokefree.gov/pubs/clearing_the_air.pdf  Smokefree.gov http://smokefree.gov/  QuitNet http://www.quitnet.com/    1020-0741 [employer], [address] [place] [address]. All rights reserved. This information is not intended as a substitute for professional medical care. Always follow your healthcare professional's instructions.    Lung Cancer Screening   Frequently Asked Questions  If you are at high-risk for lung cancer, getting screened with low-dose computed tomography (LDCT) every year can help save your life. This handout offers answers to some of the most common questions about lung cancer screening. If you have other questions, please call [phone] ([phone]).     What is it?  Lung cancer screening uses special X-ray technology to create an image of your lung tissue. The exam is quick and easy and takes less than 10 seconds. We don t give you any medicine or use any needles. You can eat before and after the exam. You don t need to change your clothes as long as the clothing on your chest doesn t contain metal. But, you do need to be able to hold your breath for at least 6 seconds during the exam.    What is the goal of lung cancer screening?  The goal of lung cancer screening is to save lives. Many times, lung cancer is not found until a person starts having physical symptoms. Lung cancer screening  can help detect lung cancer in the earliest stages when it may be easier to treat.    Who should be screened for lung cancer?  We suggest lung cancer screening for anyone who is at high-risk for lung cancer. You are in the high-risk group if you:      are between the ages of 55 and 79, and    have smoked at least 1 pack of cigarettes a day for 20 or more years, and    still smoke or have quit within the past 15 years.    However, if you have a new cough or shortness of breath, you should talk to your doctor before being screened.    Why does it matter if I have symptoms?  Certain symptoms can be a sign that you have a condition in your lungs that should be checked and treated by your doctor. These symptoms include fever, chest pain, a new or changing cough, shortness of breath that you have never felt before, coughing up blood or unexplained weight loss. Having any of these symptoms can greatly affect the results of lung cancer screening.       Should all smokers get an LDCT lung cancer screening exam?  It depends. Lung cancer screening is for a very specific group of men and women who have a history of heavy smoking over a long period of time (see  Who should be screened for lung cancer  above).  I am in the high-risk group, but have been diagnosed with cancer in the past. Is LDCT lung cancer screening right for me?  In some cases, you should not have LDCT lung screening, such as when your doctor is already following your cancer with CT scan studies. Your doctor will help you decide if LDCT lung screening is right for you.  Do I need to have a screening exam every year?  Yes. If you are in the high-risk group described earlier, you should get an LDCT lung cancer screening exam every year until you are 79, or are no longer willing or able to undergo screening and possible procedures to diagnose and treat lung cancer.  How effective is LDCT at preventing death from lung cancer?  Studies have shown that LDCT lung  cancer screening can lower the risk of death from lung cancer by 20 percent in people who are at high-risk.  What are the risks?  There are some risks and limitations of LDCT lung cancer screening. We want to make sure you understand the risks and benefits, so please let us know if you have any questions. Your doctor may want to talk with you more about these risks.    Radiation exposure: As with any exam that uses radiation, there is a very small increased risk of cancer. The amount of radiation in LDCT is small--about the same amount a person would get from a mammogram. Your doctor orders the exam when he or she feels the potential benefits outweigh the risks.    False negatives: No test is perfect, including LDCT. It is possible that you may have a medical condition, including lung cancer, that is not found during your exam. This is called a false negative result.    False positives and more testing: LDCT very often finds something in the lung that could be cancer, but in fact is not. This is called a false positive result. False positive tests often cause anxiety. To make sure these findings are not cancer, you may need to have more tests. These tests will be done only if you give us permission. Sometimes patients need a treatment that can have side effects, such as a biopsy. For more information on false positives, see  What can I expect from the results?     Findings not related to lung cancer: Your LDCT exam also takes pictures of areas of your body next to your lungs. In a very small number of cases, the CT scan will show an abnormal finding in one of these areas, such as your kidneys, adrenal glands, liver or thyroid. This finding may not be serious, but you may need more tests. Your doctor can help you decide what other tests you may need, if any.  What can I expect from the results?  About 1 out of 4 LDCT exams will find something that may need more tests. Most of the time, these findings are lung  nodules. Lung nodules are very small collections of tissue in the lung. These nodules are very common, and the vast majority--more than 97 percent--are not cancer (benign). Most are normal lymph nodes or small areas of scarring from past infections.  But, if a small lung nodule is found to be cancer, the cancer can be cured more than 90 percent of the time. To know if the nodule is cancer, we may need to get more images before your next yearly screening exam. If the nodule has suspicious features (for example, it is large, has an odd shape or grows over time), we will refer you to a specialist for further testing.  Will my doctor also get the results?  Yes. Your doctor will get a copy of your results.  Is it okay to keep smoking now that there s a cancer screening exam?  No. Tobacco is one of the strongest cancer-causing agents. It causes not only lung cancer, but other cancers and cardiovascular (heart) diseases as well. The damage caused by smoking builds over time. This means that the longer you smoke, the higher your risk of disease. While it is never too late to quit, the sooner you quit, the better.  Where can I find help to quit smoking?  The best way to prevent lung cancer is to stop smoking. If you have already quit smoking, congratulations and keep it up! For help on quitting smoking, please call [employer] at 6-562-QUITNOW (1-920.530.7353) or the American Cancer Society at 1-535.715.9614 to find local resources near you.  One-on-one health coaching:  If you d prefer to work individually with a health care provider on tobacco cessation, we offer:      Medication Therapy Management:  Our specially trained pharmacists work closely with you and your doctor to help you quit smoking.  Call [phone] or [phone] (toll free).

## 2022-07-13 LAB
ALBUMIN SERPL-MCNC: 4.3 G/DL (ref 3.4–5)
ALP SERPL-CCNC: 82 U/L (ref 40–150)
ALT SERPL W P-5'-P-CCNC: 27 U/L (ref 0–50)
ANION GAP SERPL CALCULATED.3IONS-SCNC: 7 MMOL/L (ref 3–14)
AST SERPL W P-5'-P-CCNC: 20 U/L (ref 0–45)
BILIRUB SERPL-MCNC: 1.2 MG/DL (ref 0.2–1.3)
BUN SERPL-MCNC: 6 MG/DL (ref 7–30)
CALCIUM SERPL-MCNC: 9.7 MG/DL (ref 8.5–10.1)
CHLORIDE BLD-SCNC: 100 MMOL/L (ref 94–109)
CHOLEST SERPL-MCNC: 209 MG/DL
CO2 SERPL-SCNC: 29 MMOL/L (ref 20–32)
CREAT SERPL-MCNC: 0.73 MG/DL (ref 0.52–1.04)
DEPRECATED CALCIDIOL+CALCIFEROL SERPL-MC: 36 UG/L (ref 20–75)
FASTING STATUS PATIENT QL REPORTED: NO
FASTING STATUS PATIENT QL REPORTED: NO
GFR SERPL CREATININE-BSD FRML MDRD: >90 ML/MIN/1.73M2
GLUCOSE BLD-MCNC: 88 MG/DL (ref 70–99)
GLUCOSE BLD-MCNC: 88 MG/DL (ref 70–99)
HCV AB SERPL QL IA: NONREACTIVE
HDLC SERPL-MCNC: 58 MG/DL
LDLC SERPL CALC-MCNC: 127 MG/DL
NONHDLC SERPL-MCNC: 151 MG/DL
POTASSIUM BLD-SCNC: 4.2 MMOL/L (ref 3.4–5.3)
PROT SERPL-MCNC: 7.9 G/DL (ref 6.8–8.8)
SODIUM SERPL-SCNC: 136 MMOL/L (ref 133–144)
TRIGL SERPL-MCNC: 118 MG/DL
TSH SERPL DL<=0.005 MIU/L-ACNC: 2 MU/L (ref 0.4–4)

## 2022-07-14 LAB — PREALB SERPL IA-MCNC: 29 MG/DL (ref 15–45)

## 2022-07-15 LAB
BKR LAB AP GYN ADEQUACY: NORMAL
BKR LAB AP GYN INTERPRETATION: NORMAL
BKR LAB AP HPV REFLEX: NORMAL
BKR LAB AP PREVIOUS ABNORMAL: NORMAL
PATH REPORT.COMMENTS IMP SPEC: NORMAL
PATH REPORT.COMMENTS IMP SPEC: NORMAL
PATH REPORT.RELEVANT HX SPEC: NORMAL

## 2022-07-19 ENCOUNTER — PATIENT OUTREACH (OUTPATIENT)
Dept: FAMILY MEDICINE | Facility: CLINIC | Age: 52
End: 2022-07-19

## 2022-07-19 LAB
HUMAN PAPILLOMA VIRUS 16 DNA: NEGATIVE
HUMAN PAPILLOMA VIRUS 18 DNA: NEGATIVE
HUMAN PAPILLOMA VIRUS FINAL DIAGNOSIS: ABNORMAL
HUMAN PAPILLOMA VIRUS OTHER HR: POSITIVE

## 2022-11-19 ENCOUNTER — HEALTH MAINTENANCE LETTER (OUTPATIENT)
Age: 52
End: 2022-11-19

## 2022-11-28 ENCOUNTER — NURSE TRIAGE (OUTPATIENT)
Dept: NURSING | Facility: CLINIC | Age: 52
End: 2022-11-28

## 2022-11-28 DIAGNOSIS — R06.2 WHEEZING: ICD-10-CM

## 2022-11-28 RX ORDER — PREDNISONE 20 MG/1
TABLET ORAL
Qty: 15 TABLET | Refills: 0 | Status: CANCELLED | OUTPATIENT
Start: 2022-11-28

## 2022-11-28 RX ORDER — AZITHROMYCIN 250 MG/1
TABLET, FILM COATED ORAL
Qty: 6 TABLET | Refills: 0 | Status: CANCELLED | OUTPATIENT
Start: 2022-11-28 | End: 2022-12-03

## 2022-11-28 NOTE — TELEPHONE ENCOUNTER
"Patient is calling and states that two weeks ago grandson diagnosed with RSV and influenza A.  Patient feels that she got sick from grandson.  Cough is present and fatigue and is she walks and gets winded and tired. Low appetite and no strength.  Denies fever.  Patient first got sick about two weeks ago and symptoms were a fever, chills, vomiting, diarrhea, fatigue and no appetite.  Patient denies severe difficulty breathing.  Denies wheezing and denies blood clots and prolonged bedrest.  Denies hip or leg fracture.  Denies major surgery in th past month.  Denies palpitations.  Patient is requesting a virtual visit.       Reason for Disposition    MILD difficulty breathing (e.g., minimal/no SOB at rest, SOB with walking, pulse < 100) of new-onset or worse than normal    Additional Information    Negative: SEVERE difficulty breathing (e.g., struggling for each breath, speaks in single words, pulse > 120)    Negative: Breathing stopped and hasn't returned    Negative: Choking on something    Negative: Bluish (or gray) lips or face    Negative: Difficult to awaken or acting confused (e.g., disoriented, slurred speech)    Negative: Passed out (i.e., fainted, collapsed and was not responding)    Negative: Wheezing started suddenly after medicine, an allergic food, or bee sting    Negative: Stridor    Negative: Slow, shallow and weak breathing    Negative: Sounds like a life-threatening emergency to the triager    Negative: MODERATE difficulty breathing (e.g., speaks in phrases, SOB even at rest, pulse 100-120) of new-onset or worse than normal    Negative: Oxygen level (e.g., pulse oximetry) 90 percent or lower    Negative: Wheezing can be heard across the room    Negative: Drooling or spitting out saliva (because can't swallow)    Negative: Any history of prior \"blood clot\" in leg or lungs    Negative: Illness requiring prolonged bedrest in past month (e.g., immobilization, long hospital stay)    Negative: Hip or leg " "fracture (broken bone) in past month (or had cast on leg or ankle in past month)    Negative: Major surgery in the past month    Negative: Long-distance travel in past month (e.g., car, bus, train, plane; with trip lasting 6 or more hours)    Negative: Cancer treatment in past six months (or has cancer now)    Negative: Extra heart beats OR irregular heart beating (i.e., \"palpitations\")    Negative: Fever > 103 F (39.4 C)    Negative: Fever > 101 F (38.3 C) and over 60 years of age    Negative: Fever > 100.0 F (37.8 C) and bedridden (e.g., nursing home patient, stroke, chronic illness, recovering from surgery)    Negative: Fever > 100.0 F (37.8 C) and diabetes mellitus or weak immune system (e.g., HIV positive, cancer chemo, splenectomy, organ transplant, chronic steroids)    Negative: Periods where breathing stops and then resumes normally and bedridden (e.g., nursing home patient, CVA)    Negative: Pregnant or postpartum (from 0 to 6 weeks after delivery)    Negative: Patient sounds very sick or weak to the triager    Protocols used: BREATHING DIFFICULTY-A-OH      "

## 2022-11-28 NOTE — TELEPHONE ENCOUNTER
Requested Prescriptions   Pending Prescriptions Disp Refills     predniSONE (DELTASONE) 20 MG tablet 15 tablet 0     Sig: Take 2 tablets (40 mg) daily x5 days.  Then take 1 tablet (20 mg) daily x5 days.       There is no refill protocol information for this order

## 2022-11-29 ENCOUNTER — VIRTUAL VISIT (OUTPATIENT)
Dept: FAMILY MEDICINE | Facility: CLINIC | Age: 52
End: 2022-11-29
Payer: COMMERCIAL

## 2022-11-29 DIAGNOSIS — Z12.11 COLON CANCER SCREENING: ICD-10-CM

## 2022-11-29 DIAGNOSIS — R06.09 DYSPNEA ON EXERTION: ICD-10-CM

## 2022-11-29 DIAGNOSIS — R05.1 ACUTE COUGH: Primary | ICD-10-CM

## 2022-11-29 PROCEDURE — 99214 OFFICE O/P EST MOD 30 MIN: CPT | Mod: 95 | Performed by: FAMILY MEDICINE

## 2022-11-29 RX ORDER — AZITHROMYCIN 250 MG/1
TABLET, FILM COATED ORAL
Qty: 6 TABLET | Refills: 0 | Status: SHIPPED | OUTPATIENT
Start: 2022-11-29 | End: 2022-12-04

## 2022-11-29 RX ORDER — PREDNISONE 20 MG/1
20 TABLET ORAL 2 TIMES DAILY WITH MEALS
Qty: 12 TABLET | Refills: 0 | Status: SHIPPED | OUTPATIENT
Start: 2022-11-29 | End: 2022-12-05

## 2022-11-29 NOTE — PROGRESS NOTES
Vianney is a 52 year old who is being evaluated via a billable telephone visit.      What phone number would you like to be contacted at? [phone]  How would you like to obtain your AVS? MyChart    Assessment & Plan     (R05.1) Acute cough  (primary encounter diagnosis)  (R06.09) Dyspnea on exertion  Comment: She is not vaccinated against influenza or COVID-19. Her history suggests a post-influenza syndrome such as acute bronchitis or pneumonia. My preference is for her to come in now for a CXR but the patient wants to try empiric treatment first.  Plan: azithromycin (ZITHROMAX) 250 MG tablet,         predniSONE (DELTASONE) 20 MG tablet        Use albuterol MDI Q4 hours PRN. Return in 3 days (on 12/2/2022) for recheck if symptoms fail to improve by then, or follow up if symptoms have not resolved by 12/5/22.      (Z12.11) Colon cancer screening  Comment:   Plan: Colonoscopy Screening  Referral                21 minutes spent on the date of the encounter doing chart review, patient visit and documentation          Jose High MD  [facility]   Vianney is a 52 year old, presenting for the following health issues:  Cough, Fatigue, and Shortness of Breath      HPI     Acute Illness  Acute illness concerns: Cough, Shortness (when talking and going up stairs) of Breath and fatigue   Onset/Duration: 2 weeks   Symptoms:  Fever: No  Chills/Sweats: No  Headache (location?): No  Sinus Pressure: No  Conjunctivitis:  No  Ear Pain: no  Rhinorrhea: No  Congestion: YES  Sore Throat:   Cough: YES-non-productive, productive of yellow sputum, with shortness of breath  Wheeze: No  Decreased Appetite: YES  Nausea: No  Vomiting: No  Diarrhea: No  Dysuria/Freq.: No  Dysuria or Hematuria: No  Fatigue/Achiness: YES  Sick/Strep Exposure: YES- grandson has RSV and Influenza A  Therapies tried and outcome: Mucinex every 12 hours, albuterol inhaler    Patient reports that each day her  symptoms have been getting better. The patient reports her first symptoms (fatigue) started 11/17/22 in the evening, following taking her grandson into an appointment that same morning (where he was later diagnosed with influenza A). She reports on 11/18/22 she was vomiting, had diarrhea, cough, congestion, fatigue, and a fever. She returned to work on 11/24/22 but still has SOB, a cough, and fatigue. She reports her current O2 sat is 97%.        Review of Systems         Objective           Vitals:  No vitals were obtained today due to virtual visit.    Physical Exam   healthy, alert and no distress  PSYCH: Alert and oriented times 3; coherent speech, normal   rate and volume, able to articulate logical thoughts, able   to abstract reason, no tangential thoughts, no hallucinations   or delusions  Her affect is normal and pleasant  RESP: No cough, no audible wheezing, able to talk in full sentences  Remainder of exam unable to be completed due to telephone visits                Phone call duration: 9 minutes        This document serves as a record of the services and decisions personally performed and made by Dr. High. It was created on his behalf by Terrell Stovall, a trained medical scribe. The creation of this document is based the provider's statements to the medical scribe.  Terrell Stovall,  9:53 AM

## 2022-11-29 NOTE — PROGRESS NOTES
Vianney is a 52 year old who is being evaluated via a billable telephone visit.      What phone number would you like to be contacted at? [phone]  How would you like to obtain your AVS? MyChart    Assessment & Plan     (R05.1) Acute cough  (primary encounter diagnosis)  (R06.09) Dyspnea on exertion  Comment: She is not vaccinated against influenza or COVID-19. Her history suggests a post-influenza syndrome such as acute bronchitis or pneumonia. My preference is for her to come in now for a CXR but the patient wants to try empiric treatment first.  Plan: azithromycin (ZITHROMAX) 250 MG tablet,         predniSONE (DELTASONE) 20 MG tablet        Use albuterol MDI Q4 hours PRN. Return in 3 days (on 12/2/2022) for recheck if symptoms fail to improve by then, or follow up if symptoms have not resolved by 12/5/22.      (Z12.11) Colon cancer screening  Comment:   Plan: Colonoscopy Screening  Referral                21 minutes spent on the date of the encounter doing chart review, patient visit and documentation          Jose High MD  [facility]   Vianney is a 52 year old, presenting for the following health issues:  Cough, Fatigue, and Shortness of Breath      HPI     Acute Illness  Acute illness concerns: Cough, Shortness (when talking and going up stairs) of Breath and fatigue   Onset/Duration: 2 weeks   Symptoms:  Fever: No  Chills/Sweats: No  Headache (location?): No  Sinus Pressure: No  Conjunctivitis:  No  Ear Pain: no  Rhinorrhea: No  Congestion: YES  Sore Throat:   Cough: YES-non-productive, productive of yellow sputum, with shortness of breath  Wheeze: No  Decreased Appetite: YES  Nausea: No  Vomiting: No  Diarrhea: No  Dysuria/Freq.: No  Dysuria or Hematuria: No  Fatigue/Achiness: YES  Sick/Strep Exposure: YES- grandson has RSV and Influenza A  Therapies tried and outcome: Mucinex every 12 hours, albuterol inhaler    Patient reports that each day her  symptoms have been getting better. The patient reports her first symptoms (fatigue) started 11/17/22 in the evening, following taking her grandson into an appointment that same morning (where he was later diagnosed with influenza A). She reports on 11/18/22 she was vomiting, had diarrhea, cough, congestion, fatigue, and a fever. She returned to work on 11/24/22 but still has SOB, a cough, and fatigue. She reports her current O2 sat is 97%.        Review of Systems         Objective           Vitals:  No vitals were obtained today due to virtual visit.    Physical Exam   healthy, alert and no distress  PSYCH: Alert and oriented times 3; coherent speech, normal   rate and volume, able to articulate logical thoughts, able   to abstract reason, no tangential thoughts, no hallucinations   or delusions  Her affect is normal and pleasant  RESP: No cough, no audible wheezing, able to talk in full sentences  Remainder of exam unable to be completed due to telephone visits                Phone call duration: 9 minutes        This document serves as a record of the services and decisions personally performed and made by Dr. High. It was created on his behalf by Terrell Stovall, a trained medical scribe. The creation of this document is based the provider's statements to the medical scribe.  Terrell Stovall,  9:53 AM

## 2023-04-09 ENCOUNTER — HEALTH MAINTENANCE LETTER (OUTPATIENT)
Age: 53
End: 2023-04-09

## 2023-04-13 DIAGNOSIS — R06.2 WHEEZING: ICD-10-CM

## 2023-04-14 RX ORDER — ALBUTEROL SULFATE 90 UG/1
AEROSOL, METERED RESPIRATORY (INHALATION)
Qty: 8.5 G | Refills: 0 | Status: SHIPPED | OUTPATIENT
Start: 2023-04-14 | End: 2023-05-09

## 2023-05-09 DIAGNOSIS — R06.2 WHEEZING: ICD-10-CM

## 2023-05-09 RX ORDER — ALBUTEROL SULFATE 90 UG/1
AEROSOL, METERED RESPIRATORY (INHALATION)
Qty: 8.5 G | Refills: 0 | Status: SHIPPED | OUTPATIENT
Start: 2023-05-09

## 2023-06-21 ENCOUNTER — PATIENT OUTREACH (OUTPATIENT)
Dept: FAMILY MEDICINE | Facility: CLINIC | Age: 53
End: 2023-06-21
Payer: COMMERCIAL

## 2023-06-21 DIAGNOSIS — R87.810 CERVICAL HIGH RISK HPV (HUMAN PAPILLOMAVIRUS) TEST POSITIVE: ICD-10-CM

## 2023-09-10 ENCOUNTER — HEALTH MAINTENANCE LETTER (OUTPATIENT)
Age: 53
End: 2023-09-10

## 2024-11-02 ENCOUNTER — HEALTH MAINTENANCE LETTER (OUTPATIENT)
Age: 54
End: 2024-11-02

## 2025-04-19 ENCOUNTER — HEALTH MAINTENANCE LETTER (OUTPATIENT)
Age: 55
End: 2025-04-19